# Patient Record
Sex: MALE | Race: OTHER | HISPANIC OR LATINO | URBAN - METROPOLITAN AREA
[De-identification: names, ages, dates, MRNs, and addresses within clinical notes are randomized per-mention and may not be internally consistent; named-entity substitution may affect disease eponyms.]

---

## 2017-10-01 ENCOUNTER — EMERGENCY (EMERGENCY)
Facility: HOSPITAL | Age: 22
LOS: 1 days | Discharge: HOME | End: 2017-10-01
Attending: EMERGENCY MEDICINE
Payer: MEDICAID

## 2017-10-01 VITALS
OXYGEN SATURATION: 100 % | TEMPERATURE: 98 F | RESPIRATION RATE: 18 BRPM | SYSTOLIC BLOOD PRESSURE: 115 MMHG | HEART RATE: 70 BPM | DIASTOLIC BLOOD PRESSURE: 60 MMHG

## 2017-10-01 VITALS
RESPIRATION RATE: 16 BRPM | SYSTOLIC BLOOD PRESSURE: 128 MMHG | TEMPERATURE: 98 F | HEART RATE: 81 BPM | DIASTOLIC BLOOD PRESSURE: 72 MMHG | OXYGEN SATURATION: 99 % | HEIGHT: 73 IN | WEIGHT: 149.91 LBS

## 2017-10-01 DIAGNOSIS — R07.9 CHEST PAIN, UNSPECIFIED: ICD-10-CM

## 2017-10-01 LAB
ANION GAP SERPL CALC-SCNC: 5 MMOL/L — SIGNIFICANT CHANGE UP (ref 5–17)
BASOPHILS # BLD AUTO: 0.1 K/UL — SIGNIFICANT CHANGE UP (ref 0–0.2)
BASOPHILS NFR BLD AUTO: 0.3 % — SIGNIFICANT CHANGE UP (ref 0–2)
BUN SERPL-MCNC: 18 MG/DL — SIGNIFICANT CHANGE UP (ref 7–18)
CALCIUM SERPL-MCNC: 8.9 MG/DL — SIGNIFICANT CHANGE UP (ref 8.4–10.5)
CHLORIDE SERPL-SCNC: 101 MMOL/L — SIGNIFICANT CHANGE UP (ref 96–108)
CK MB CFR SERPL CALC: <1 NG/ML — SIGNIFICANT CHANGE UP (ref 0–3.6)
CO2 SERPL-SCNC: 29 MMOL/L — SIGNIFICANT CHANGE UP (ref 22–31)
CREAT SERPL-MCNC: 0.97 MG/DL — SIGNIFICANT CHANGE UP (ref 0.5–1.3)
EOSINOPHIL # BLD AUTO: 0.1 K/UL — SIGNIFICANT CHANGE UP (ref 0–0.5)
EOSINOPHIL NFR BLD AUTO: 0.4 % — SIGNIFICANT CHANGE UP (ref 0–6)
GLUCOSE SERPL-MCNC: 92 MG/DL — SIGNIFICANT CHANGE UP (ref 70–99)
HCT VFR BLD CALC: 46.8 % — SIGNIFICANT CHANGE UP (ref 39–50)
HGB BLD-MCNC: 14.6 G/DL — SIGNIFICANT CHANGE UP (ref 13–17)
LYMPHOCYTES # BLD AUTO: 1.3 K/UL — SIGNIFICANT CHANGE UP (ref 1–3.3)
LYMPHOCYTES # BLD AUTO: 7.7 % — LOW (ref 13–44)
MCHC RBC-ENTMCNC: 30.1 PG — SIGNIFICANT CHANGE UP (ref 27–34)
MCHC RBC-ENTMCNC: 31.3 GM/DL — LOW (ref 32–36)
MCV RBC AUTO: 96.2 FL — SIGNIFICANT CHANGE UP (ref 80–100)
MONOCYTES # BLD AUTO: 1.3 K/UL — HIGH (ref 0–0.9)
MONOCYTES NFR BLD AUTO: 7.3 % — SIGNIFICANT CHANGE UP (ref 2–14)
NEUTROPHILS # BLD AUTO: 14.5 K/UL — HIGH (ref 1.8–7.4)
NEUTROPHILS NFR BLD AUTO: 84.3 % — HIGH (ref 43–77)
PLATELET # BLD AUTO: 299 K/UL — SIGNIFICANT CHANGE UP (ref 150–400)
POTASSIUM SERPL-MCNC: 4.4 MMOL/L — SIGNIFICANT CHANGE UP (ref 3.5–5.3)
POTASSIUM SERPL-SCNC: 4.4 MMOL/L — SIGNIFICANT CHANGE UP (ref 3.5–5.3)
RBC # BLD: 4.86 M/UL — SIGNIFICANT CHANGE UP (ref 4.2–5.8)
RBC # FLD: 12.4 % — SIGNIFICANT CHANGE UP (ref 10.3–14.5)
SODIUM SERPL-SCNC: 135 MMOL/L — SIGNIFICANT CHANGE UP (ref 135–145)
TROPONIN I SERPL-MCNC: <0.015 NG/ML — SIGNIFICANT CHANGE UP (ref 0–0.04)
WBC # BLD: 17.2 K/UL — HIGH (ref 3.8–10.5)
WBC # FLD AUTO: 17.2 K/UL — HIGH (ref 3.8–10.5)

## 2017-10-01 PROCEDURE — 80048 BASIC METABOLIC PNL TOTAL CA: CPT

## 2017-10-01 PROCEDURE — 36000 PLACE NEEDLE IN VEIN: CPT

## 2017-10-01 PROCEDURE — 85027 COMPLETE CBC AUTOMATED: CPT

## 2017-10-01 PROCEDURE — 93005 ELECTROCARDIOGRAM TRACING: CPT

## 2017-10-01 PROCEDURE — 71046 X-RAY EXAM CHEST 2 VIEWS: CPT

## 2017-10-01 PROCEDURE — 71020: CPT | Mod: 26

## 2017-10-01 PROCEDURE — 99285 EMERGENCY DEPT VISIT HI MDM: CPT | Mod: 25

## 2017-10-01 PROCEDURE — 99283 EMERGENCY DEPT VISIT LOW MDM: CPT | Mod: 25

## 2017-10-01 PROCEDURE — 84484 ASSAY OF TROPONIN QUANT: CPT

## 2017-10-01 PROCEDURE — 82553 CREATINE MB FRACTION: CPT

## 2017-10-01 NOTE — ED PROVIDER NOTE - MEDICAL DECISION MAKING DETAILS
23 y/o male presents with chest pain.  PERC negative, EKG is NSR.  Will check x-ray, labs, and reassess

## 2017-10-01 NOTE — ED PROVIDER NOTE - OBJECTIVE STATEMENT
21 y/o male with PMHx of depression/ anxiety presents with complaint of chest pain.  pt says he was sitting in bed when the pain began.  Denies nasuea, vomiting, or shortness of breath.  Symptoms resolved by the time the patient arrived to the ED. pt says he has had similar symptoms in the past. 23 y/o male with PMHx of depression/ anxiety presents with complaint of chest pain.  pt says he was sitting in bed when the pain began.  Denies nausea, vomiting, or shortness of breath.  Symptoms resolved by the time the patient arrived to the ED. pt says he has had similar symptoms in the past.

## 2018-01-17 ENCOUNTER — INPATIENT (INPATIENT)
Facility: HOSPITAL | Age: 23
LOS: 5 days | Discharge: HOME | DRG: 419 | End: 2018-01-23
Attending: HOSPITALIST | Admitting: HOSPITALIST
Payer: MEDICAID

## 2018-01-17 VITALS
SYSTOLIC BLOOD PRESSURE: 118 MMHG | TEMPERATURE: 98 F | OXYGEN SATURATION: 99 % | WEIGHT: 160.06 LBS | HEIGHT: 73 IN | DIASTOLIC BLOOD PRESSURE: 75 MMHG | HEART RATE: 66 BPM | RESPIRATION RATE: 16 BRPM

## 2018-01-17 LAB
ALBUMIN SERPL ELPH-MCNC: 3.8 G/DL — SIGNIFICANT CHANGE UP (ref 3.5–5)
ALP SERPL-CCNC: 74 U/L — SIGNIFICANT CHANGE UP (ref 40–120)
ALT FLD-CCNC: 155 U/L DA — HIGH (ref 10–60)
ANION GAP SERPL CALC-SCNC: 7 MMOL/L — SIGNIFICANT CHANGE UP (ref 5–17)
AST SERPL-CCNC: 291 U/L — HIGH (ref 10–40)
BASOPHILS # BLD AUTO: 0.1 K/UL — SIGNIFICANT CHANGE UP (ref 0–0.2)
BASOPHILS NFR BLD AUTO: 0.5 % — SIGNIFICANT CHANGE UP (ref 0–2)
BILIRUB SERPL-MCNC: 0.3 MG/DL — SIGNIFICANT CHANGE UP (ref 0.2–1.2)
BUN SERPL-MCNC: 14 MG/DL — SIGNIFICANT CHANGE UP (ref 7–18)
CALCIUM SERPL-MCNC: 8.7 MG/DL — SIGNIFICANT CHANGE UP (ref 8.4–10.5)
CHLORIDE SERPL-SCNC: 105 MMOL/L — SIGNIFICANT CHANGE UP (ref 96–108)
CO2 SERPL-SCNC: 28 MMOL/L — SIGNIFICANT CHANGE UP (ref 22–31)
CREAT SERPL-MCNC: 0.86 MG/DL — SIGNIFICANT CHANGE UP (ref 0.5–1.3)
EOSINOPHIL # BLD AUTO: 0.1 K/UL — SIGNIFICANT CHANGE UP (ref 0–0.5)
EOSINOPHIL NFR BLD AUTO: 0.9 % — SIGNIFICANT CHANGE UP (ref 0–6)
GLUCOSE SERPL-MCNC: 114 MG/DL — HIGH (ref 70–99)
HCT VFR BLD CALC: 43.1 % — SIGNIFICANT CHANGE UP (ref 39–50)
HGB BLD-MCNC: 14.1 G/DL — SIGNIFICANT CHANGE UP (ref 13–17)
LIDOCAIN IGE QN: HIGH U/L (ref 73–393)
LYMPHOCYTES # BLD AUTO: 1.8 K/UL — SIGNIFICANT CHANGE UP (ref 1–3.3)
LYMPHOCYTES # BLD AUTO: 13.6 % — SIGNIFICANT CHANGE UP (ref 13–44)
MCHC RBC-ENTMCNC: 31.3 PG — SIGNIFICANT CHANGE UP (ref 27–34)
MCHC RBC-ENTMCNC: 32.8 GM/DL — SIGNIFICANT CHANGE UP (ref 32–36)
MCV RBC AUTO: 95.4 FL — SIGNIFICANT CHANGE UP (ref 80–100)
MONOCYTES # BLD AUTO: 1 K/UL — HIGH (ref 0–0.9)
MONOCYTES NFR BLD AUTO: 7.5 % — SIGNIFICANT CHANGE UP (ref 2–14)
NEUTROPHILS # BLD AUTO: 10.5 K/UL — HIGH (ref 1.8–7.4)
NEUTROPHILS NFR BLD AUTO: 77.6 % — HIGH (ref 43–77)
PLATELET # BLD AUTO: 236 K/UL — SIGNIFICANT CHANGE UP (ref 150–400)
POTASSIUM SERPL-MCNC: 3.9 MMOL/L — SIGNIFICANT CHANGE UP (ref 3.5–5.3)
POTASSIUM SERPL-SCNC: 3.9 MMOL/L — SIGNIFICANT CHANGE UP (ref 3.5–5.3)
PROT SERPL-MCNC: 7.2 G/DL — SIGNIFICANT CHANGE UP (ref 6–8.3)
RBC # BLD: 4.52 M/UL — SIGNIFICANT CHANGE UP (ref 4.2–5.8)
RBC # FLD: 11.6 % — SIGNIFICANT CHANGE UP (ref 10.3–14.5)
SODIUM SERPL-SCNC: 140 MMOL/L — SIGNIFICANT CHANGE UP (ref 135–145)
WBC # BLD: 13.6 K/UL — HIGH (ref 3.8–10.5)
WBC # FLD AUTO: 13.6 K/UL — HIGH (ref 3.8–10.5)

## 2018-01-17 PROCEDURE — 74177 CT ABD & PELVIS W/CONTRAST: CPT | Mod: 26

## 2018-01-17 RX ORDER — FAMOTIDINE 10 MG/ML
20 INJECTION INTRAVENOUS ONCE
Qty: 0 | Refills: 0 | Status: COMPLETED | OUTPATIENT
Start: 2018-01-17 | End: 2018-01-17

## 2018-01-17 RX ORDER — SODIUM CHLORIDE 9 MG/ML
1000 INJECTION INTRAMUSCULAR; INTRAVENOUS; SUBCUTANEOUS ONCE
Qty: 0 | Refills: 0 | Status: COMPLETED | OUTPATIENT
Start: 2018-01-17 | End: 2018-01-17

## 2018-01-17 RX ORDER — ONDANSETRON 8 MG/1
4 TABLET, FILM COATED ORAL ONCE
Qty: 0 | Refills: 0 | Status: COMPLETED | OUTPATIENT
Start: 2018-01-17 | End: 2018-01-17

## 2018-01-17 RX ADMIN — SODIUM CHLORIDE 1000 MILLILITER(S): 9 INJECTION INTRAMUSCULAR; INTRAVENOUS; SUBCUTANEOUS at 22:09

## 2018-01-17 RX ADMIN — FAMOTIDINE 20 MILLIGRAM(S): 10 INJECTION INTRAVENOUS at 22:09

## 2018-01-17 NOTE — ED PROVIDER NOTE - PROGRESS NOTE DETAILS
Pt reassessed, still with abdominal discomfort.  CT with no signs Pt reassessed, still with abdominal discomfort.  CT with no signs of pancreatitis.  Given abdominal pain and patient not tolerating PO, will admit.

## 2018-01-17 NOTE — ED PROVIDER NOTE - OBJECTIVE STATEMENT
21 y/o M pt w/ PMHx of Anxiety and Depression presents to ED c/o epigastric burning pain, worse since yesterday. Pt also reports nausea. Pt denies vomiting, fever, chills, or any other complaints. NKDA.

## 2018-01-18 DIAGNOSIS — R74.8 ABNORMAL LEVELS OF OTHER SERUM ENZYMES: ICD-10-CM

## 2018-01-18 DIAGNOSIS — F32.9 MAJOR DEPRESSIVE DISORDER, SINGLE EPISODE, UNSPECIFIED: ICD-10-CM

## 2018-01-18 DIAGNOSIS — Z29.9 ENCOUNTER FOR PROPHYLACTIC MEASURES, UNSPECIFIED: ICD-10-CM

## 2018-01-18 DIAGNOSIS — K85.90 ACUTE PANCREATITIS WITHOUT NECROSIS OR INFECTION, UNSPECIFIED: ICD-10-CM

## 2018-01-18 DIAGNOSIS — K21.9 GASTRO-ESOPHAGEAL REFLUX DISEASE WITHOUT ESOPHAGITIS: ICD-10-CM

## 2018-01-18 DIAGNOSIS — F41.9 ANXIETY DISORDER, UNSPECIFIED: ICD-10-CM

## 2018-01-18 LAB
24R-OH-CALCIDIOL SERPL-MCNC: 13.5 NG/ML — LOW (ref 30–80)
ALBUMIN SERPL ELPH-MCNC: 3.5 G/DL — SIGNIFICANT CHANGE UP (ref 3.5–5)
ALP SERPL-CCNC: 76 U/L — SIGNIFICANT CHANGE UP (ref 40–120)
ALT FLD-CCNC: 445 U/L DA — HIGH (ref 10–60)
AMYLASE P1 CFR SERPL: 2064 U/L — HIGH (ref 25–115)
AMYLASE P1 CFR SERPL: 617 U/L — HIGH (ref 25–115)
ANION GAP SERPL CALC-SCNC: 6 MMOL/L — SIGNIFICANT CHANGE UP (ref 5–17)
APPEARANCE UR: CLEAR — SIGNIFICANT CHANGE UP
AST SERPL-CCNC: 500 U/L — HIGH (ref 10–40)
BILIRUB DIRECT SERPL-MCNC: 0.1 MG/DL — SIGNIFICANT CHANGE UP (ref 0–0.2)
BILIRUB INDIRECT FLD-MCNC: 0.2 MG/DL — SIGNIFICANT CHANGE UP (ref 0.2–1)
BILIRUB SERPL-MCNC: 0.3 MG/DL — SIGNIFICANT CHANGE UP (ref 0.2–1.2)
BILIRUB UR-MCNC: NEGATIVE — SIGNIFICANT CHANGE UP
BUN SERPL-MCNC: 10 MG/DL — SIGNIFICANT CHANGE UP (ref 7–18)
CALCIUM SERPL-MCNC: 8.6 MG/DL — SIGNIFICANT CHANGE UP (ref 8.4–10.5)
CHLORIDE SERPL-SCNC: 105 MMOL/L — SIGNIFICANT CHANGE UP (ref 96–108)
CHOLEST SERPL-MCNC: 117 MG/DL — SIGNIFICANT CHANGE UP (ref 10–199)
CO2 SERPL-SCNC: 29 MMOL/L — SIGNIFICANT CHANGE UP (ref 22–31)
COLOR SPEC: YELLOW — SIGNIFICANT CHANGE UP
CREAT SERPL-MCNC: 0.83 MG/DL — SIGNIFICANT CHANGE UP (ref 0.5–1.3)
DIFF PNL FLD: NEGATIVE — SIGNIFICANT CHANGE UP
ETHANOL SERPL-MCNC: <3 MG/DL — SIGNIFICANT CHANGE UP (ref 0–10)
GLUCOSE SERPL-MCNC: 86 MG/DL — SIGNIFICANT CHANGE UP (ref 70–99)
GLUCOSE UR QL: NEGATIVE — SIGNIFICANT CHANGE UP
HAV IGM SER-ACNC: SIGNIFICANT CHANGE UP
HBV CORE IGM SER-ACNC: SIGNIFICANT CHANGE UP
HBV SURFACE AG SER-ACNC: SIGNIFICANT CHANGE UP
HCT VFR BLD CALC: 41.5 % — SIGNIFICANT CHANGE UP (ref 39–50)
HCV AB S/CO SERPL IA: 0.07 S/CO — SIGNIFICANT CHANGE UP
HCV AB SERPL-IMP: SIGNIFICANT CHANGE UP
HDLC SERPL-MCNC: 44 MG/DL — SIGNIFICANT CHANGE UP (ref 40–125)
HGB BLD-MCNC: 13.6 G/DL — SIGNIFICANT CHANGE UP (ref 13–17)
HIV 1+2 AB+HIV1 P24 AG SERPL QL IA: SIGNIFICANT CHANGE UP
KETONES UR-MCNC: NEGATIVE — SIGNIFICANT CHANGE UP
LEUKOCYTE ESTERASE UR-ACNC: NEGATIVE — SIGNIFICANT CHANGE UP
LIDOCAIN IGE QN: 6747 U/L — HIGH (ref 73–393)
LIPID PNL WITH DIRECT LDL SERPL: 62 MG/DL — SIGNIFICANT CHANGE UP
MCHC RBC-ENTMCNC: 31.6 PG — SIGNIFICANT CHANGE UP (ref 27–34)
MCHC RBC-ENTMCNC: 32.7 GM/DL — SIGNIFICANT CHANGE UP (ref 32–36)
MCV RBC AUTO: 96.8 FL — SIGNIFICANT CHANGE UP (ref 80–100)
NITRITE UR-MCNC: NEGATIVE — SIGNIFICANT CHANGE UP
PCP SPEC-MCNC: SIGNIFICANT CHANGE UP
PH UR: 6 — SIGNIFICANT CHANGE UP (ref 5–8)
PLATELET # BLD AUTO: 221 K/UL — SIGNIFICANT CHANGE UP (ref 150–400)
POTASSIUM SERPL-MCNC: 4.1 MMOL/L — SIGNIFICANT CHANGE UP (ref 3.5–5.3)
POTASSIUM SERPL-SCNC: 4.1 MMOL/L — SIGNIFICANT CHANGE UP (ref 3.5–5.3)
PROT SERPL-MCNC: 6.7 G/DL — SIGNIFICANT CHANGE UP (ref 6–8.3)
PROT UR-MCNC: NEGATIVE — SIGNIFICANT CHANGE UP
RBC # BLD: 4.29 M/UL — SIGNIFICANT CHANGE UP (ref 4.2–5.8)
RBC # FLD: 11.6 % — SIGNIFICANT CHANGE UP (ref 10.3–14.5)
SODIUM SERPL-SCNC: 140 MMOL/L — SIGNIFICANT CHANGE UP (ref 135–145)
SP GR SPEC: 1.01 — SIGNIFICANT CHANGE UP (ref 1.01–1.02)
TOTAL CHOLESTEROL/HDL RATIO MEASUREMENT: 2.7 RATIO — LOW (ref 3.4–9.6)
TRIGL SERPL-MCNC: 56 MG/DL — SIGNIFICANT CHANGE UP (ref 10–149)
TSH SERPL-MCNC: 0.89 UU/ML — SIGNIFICANT CHANGE UP (ref 0.34–4.82)
UROBILINOGEN FLD QL: NEGATIVE — SIGNIFICANT CHANGE UP
WBC # BLD: 8.4 K/UL — SIGNIFICANT CHANGE UP (ref 3.8–10.5)
WBC # FLD AUTO: 8.4 K/UL — SIGNIFICANT CHANGE UP (ref 3.8–10.5)

## 2018-01-18 PROCEDURE — 99223 1ST HOSP IP/OBS HIGH 75: CPT | Mod: GC

## 2018-01-18 PROCEDURE — 99285 EMERGENCY DEPT VISIT HI MDM: CPT

## 2018-01-18 PROCEDURE — 71045 X-RAY EXAM CHEST 1 VIEW: CPT | Mod: 26

## 2018-01-18 PROCEDURE — 76705 ECHO EXAM OF ABDOMEN: CPT | Mod: 26

## 2018-01-18 RX ORDER — PANTOPRAZOLE SODIUM 20 MG/1
40 TABLET, DELAYED RELEASE ORAL DAILY
Qty: 0 | Refills: 0 | Status: DISCONTINUED | OUTPATIENT
Start: 2018-01-18 | End: 2018-01-23

## 2018-01-18 RX ORDER — MORPHINE SULFATE 50 MG/1
1 CAPSULE, EXTENDED RELEASE ORAL EVERY 4 HOURS
Qty: 0 | Refills: 0 | Status: DISCONTINUED | OUTPATIENT
Start: 2018-01-18 | End: 2018-01-22

## 2018-01-18 RX ORDER — MORPHINE SULFATE 50 MG/1
4 CAPSULE, EXTENDED RELEASE ORAL ONCE
Qty: 0 | Refills: 0 | Status: DISCONTINUED | OUTPATIENT
Start: 2018-01-18 | End: 2018-01-18

## 2018-01-18 RX ORDER — SODIUM CHLORIDE 9 MG/ML
1000 INJECTION, SOLUTION INTRAVENOUS
Qty: 0 | Refills: 0 | Status: DISCONTINUED | OUTPATIENT
Start: 2018-01-18 | End: 2018-01-19

## 2018-01-18 RX ORDER — MORPHINE SULFATE 50 MG/1
2 CAPSULE, EXTENDED RELEASE ORAL EVERY 6 HOURS
Qty: 0 | Refills: 0 | Status: DISCONTINUED | OUTPATIENT
Start: 2018-01-18 | End: 2018-01-23

## 2018-01-18 RX ORDER — RANITIDINE HYDROCHLORIDE 150 MG/1
1 TABLET, FILM COATED ORAL
Qty: 0 | Refills: 0 | COMMUNITY

## 2018-01-18 RX ADMIN — MORPHINE SULFATE 1 MILLIGRAM(S): 50 CAPSULE, EXTENDED RELEASE ORAL at 22:30

## 2018-01-18 RX ADMIN — MORPHINE SULFATE 1 MILLIGRAM(S): 50 CAPSULE, EXTENDED RELEASE ORAL at 22:11

## 2018-01-18 RX ADMIN — SODIUM CHLORIDE 200 MILLILITER(S): 9 INJECTION, SOLUTION INTRAVENOUS at 22:11

## 2018-01-18 RX ADMIN — PANTOPRAZOLE SODIUM 40 MILLIGRAM(S): 20 TABLET, DELAYED RELEASE ORAL at 12:25

## 2018-01-18 RX ADMIN — MORPHINE SULFATE 4 MILLIGRAM(S): 50 CAPSULE, EXTENDED RELEASE ORAL at 01:14

## 2018-01-18 RX ADMIN — SODIUM CHLORIDE 200 MILLILITER(S): 9 INJECTION, SOLUTION INTRAVENOUS at 01:55

## 2018-01-18 RX ADMIN — MORPHINE SULFATE 4 MILLIGRAM(S): 50 CAPSULE, EXTENDED RELEASE ORAL at 01:17

## 2018-01-18 NOTE — PATIENT PROFILE ADULT. - MENTAL HEALTH CONDITIONS/SYMPTOMS, PROFILE
depression/not presently taking medications. Did not want to be on medications long-term/anxiety disorder

## 2018-01-18 NOTE — H&P ADULT - NSHPLABSRESULTS_GEN_ALL_CORE
ICU Vital Signs Last 24 Hrs  T(C): 36.6 (18 Jan 2018 01:11), Max: 36.7 (17 Jan 2018 19:44)  T(F): 97.9 (18 Jan 2018 01:11), Max: 98 (17 Jan 2018 19:44)  HR: 64 (18 Jan 2018 01:11) (64 - 66)  BP: 112/64 (18 Jan 2018 01:11) (112/64 - 118/75)  BP(mean): --  ABP: --  ABP(mean): --  RR: 18 (18 Jan 2018 01:11) (16 - 18)  SpO2: 100% (18 Jan 2018 01:11) (99% - 100%)                          14.1   13.6  )-----------( 236      ( 17 Jan 2018 22:18 )             43.1       01-17    140  |  105  |  14  ----------------------------<  114<H>  3.9   |  28  |  0.86    Ca    8.7      17 Jan 2018 22:18    TPro  7.2  /  Alb  3.8  /  TBili  0.3  /  DBili  x   /  AST  291<H>  /  ALT  155<H>  /  AlkPhos  74  01-17  < from: CT Abdomen and Pelvis w/ IV Cont (01.17.18 @ 23:39) >    FINDINGS:  Lung bases: Clear.    Organs: The liver, gallbladder, spleen, pancreas, kidneys and adrenal   glands are unremarkable.    Gastrointestinal tract: There is no evidence of a bowel obstruction or   inflammation. The appendix is not definitely demonstrated; however, there   are no secondary signs of appendicitis.    Vascular: There is no abdominal aortic aneurysm.    Pelvis: The urinary bladder, prostate and seminal vesicles are   unremarkable.     Miscellaneous: There is no significant abdominal or pelvic   lymphadenopathy. No free air or free fluid is demonstrated.    Bones: The visualized osseous structures are unremarkable.    IMPRESSION:  No CT evidence of pancreatitis. Correlate with amylase and lipase.     < end of copied text >

## 2018-01-18 NOTE — H&P ADULT - NSHPSOCIALHISTORY_GEN_ALL_CORE
ex smoker, quit 1 year ago, smoked 1 cigar/ 1.5 years, drinks alcohol socially ( vodka/ rum), last drink was in sept 2017, used marijuana in the past, never been sexually active.

## 2018-01-18 NOTE — H&P ADULT - ATTENDING COMMENTS
Patient was seen and examined by myself this morning at about 11 am. Case was discussed with house staff in details. I have reviewed and agree with the plan as outlined above with edits where appropriate.  Please see attending chart note addendum.

## 2018-01-18 NOTE — H&P ADULT - HISTORY OF PRESENT ILLNESS
21 y/o M PMHx of Anxiety and Depression ( was on Lexapro and alprazolam Hx of suicide attempts X2, GERD presented to ED with c/o abdominal pain since 3 days. Patient started to have epigastric abdominal pain starting july 2017 saw PCP in Dec who prescribed ranitidine for gastritis, and symptoms were under control, until 3 days back when abdominal pain got worse- describes as epigastric, intermittent, lasting for 2 hours, burning and constricting type, radiating to back, 6 to 9/10 intensity, used Tylenol once without relief and has been using ranitidine without any resolution of pain, aggravated with intake of spicy foods, no relieving factors, associated with nausea. Patient denies vomiting, fever, chills, diarrhea, constipation or any other complaints.  PSH- none  FH- not significant  allergies- NKDA  Social Hx- exsmoker, quit 1 year ago, smoked 1 cigar/ 1.5 years, drinks alcohol socially ( vodka/ rum), last drink was in sept 2017, used marijuana in the past, never been sexually active.    In ED, patient had stable vitals. Labs pertinent for WBC- 13.6, AST/ ALT of 291/155, [place] of [address], s/p 1L NS bolus, 1 dose each of Pepcid, Zofran,  morphine in ED.    will admit to medicine for management of pancreatitis 21 y/o M PMHx of Anxiety and Depression ( was on Lexapro and alprazolam Hx of suicide attempts X2, GERD presented to ED with c/o abdominal pain since 3 days. Patient started to have epigastric abdominal pain starting july 2017 saw PCP in Dec who prescribed ranitidine for gastritis, and symptoms were under control, until 3 days back when abdominal pain got worse- describes as epigastric, intermittent, lasting for 2 hours, burning and constricting type, radiating to back, 6 to 9/10 intensity, used Tylenol once without relief and has been using ranitidine without any resolution of pain, aggravated with intake of spicy foods, no relieving factors, associated with nausea. Patient denies vomiting, fever, chills, diarrhea, constipation or any other complaints.  PSH- none  FH- not significant  allergies- NKDA  Social Hx- exsmoker, quit 1 year ago, smoked 1 cigar/ 1.5 years, drinks alcohol socially ( vodka/ rum), last drink was in sept 2017, used marijuana in the past, never been sexually active.    In ED, patient had stable vitals. CT abdomen s/o normal study, no evidence of pancreatitis.  Labs pertinent for WBC- 13.6, AST/ ALT of 291/155, [place] of [address], s/p 1L NS bolus, 1 dose each of Pepcid, Zofran,  morphine in ED.    will admit to medicine for evaluation of elevated lipase. 21 y/o M PMHx of Anxiety and Depression ( was on Lexapro and alprazolam Hx of suicide attempts X2, GERD presented to ED with c/o abdominal pain since 3 days. Patient started to have epigastric abdominal pain starting july 2017 saw PCP in Dec who prescribed ranitidine for gastritis, and symptoms were under control, until 3 days back when abdominal pain got worse- describes as epigastric, intermittent, lasting for 2 hours, burning and constricting type, radiating to back, 6 to 9/10 intensity, used Tylenol once without relief and has been using ranitidine without any resolution of pain, aggravated with intake of spicy foods, no relieving factors, associated with nausea. Patient denies vomiting, fever, chills, diarrhea, constipation or any other complaints.  PSH- none  FH- not significant  allergies- NKDA  Social Hx- exsmoker, quit 1 year ago, smoked 1 cigar/ 1.5 years, drinks alcohol socially ( vodka/ rum), last drink was in sept 2017, used marijuana in the past, never been sexually active.    In ED, patient had stable vitals. CT abdomen s/o normal study, no evidence of pancreatitis.  Labs pertinent for WBC- 13.6, AST/ ALT of 291/155, [place] 6[address], s/p 1L NS bolus, 1 dose each of Pepcid, Zofran,  morphine in ED.    will admit to medicine for evaluation of elevated lipase secondary to pancreatitis 23 y/o M PMHx of Anxiety and Depression ( was on Lexapro and alprazolam Hx of suicide attempts X2, GERD presented to ED with c/o abdominal pain since 3 days. Patient started to have epigastric abdominal pain starting july 2017 saw PCP in Dec who prescribed ranitidine for gastritis, and symptoms were under control, until 3 days back when abdominal pain got worse- describes as epigastric, intermittent, lasting for 2 hours, burning and constricting type, radiating to back, 6 to 9/10 intensity, used Tylenol once without relief and has been using ranitidine without any resolution of pain, aggravated with intake of spicy foods, no relieving factors, associated with nausea. Patient denies vomiting, fever, chills, diarrhea, constipation or any other complaints.  PSH- none  FH- not significant  allergies- NKDA  Social Hx- exsmoker, quit 1 year ago, smoked 1 cigar/ 1.5 years, drinks alcohol socially ( vodka/ rum), last drink was in sept 2017, used marijuana in the past, never been sexually active.    In ED, patient had stable vitals. CT abdomen s/o normal study, no evidence of pancreatitis.  Labs pertinent for WBC- 13.6, AST/ ALT of 291/155, lip[place] of 857[address], s/p 1L NS bolus, 1 dose each of Pepcid, Zofran,  morphine in ED.    will admit to medicine for management of  pancreatitis

## 2018-01-18 NOTE — H&P ADULT - NSHPREVIEWOFSYSTEMS_GEN_ALL_CORE
ROS negative for fever, SOB, chest pain, cough, rash, headache, dizziness, diaphoresis, palpitations, weakness, numbness, tingling of extremities, diarrhea, constipation, urinary disturbances.

## 2018-01-18 NOTE — PATIENT PROFILE ADULT. - PRO MENTAL HEALTH SX RECENT
feeling depressed/feels anxious times. Does not wish to talk to anyone at this time. No suicidal or homicidal ideations

## 2018-01-18 NOTE — CHART NOTE - NSCHARTNOTEFT_GEN_A_CORE
ATTENDING ADDENDUM TO ADMIT HISTORY AND PHYSICAL- late entry  Patient seen and examined by me this morning and case was discussed with the admitting resident    HPI:  21 y/o M PMHx of Anxiety and Depression ( was on Lexapro and alprazolam Hx of suicide attempts X2, GERD presented to ED with c/o abdominal pain since 3 days. Patient started to have epigastric abdominal pain starting 2017 saw PCP in Dec who prescribed ranitidine for gastritis, and symptoms were under control, until 3 days back when abdominal pain got worse- describes as epigastric, intermittent, lasting for 2 hours, burning and constricting type, radiating to back, 6 to 9/10 intensity, used Tylenol once without relief and has been using ranitidine without any resolution of pain, aggravated with intake of spicy foods, no relieving factors, associated with nausea. Patient denies vomiting, fever, chills, diarrhea, constipation or any other complaints.    PSH- none  FH- not significant  allergies- NKDA  Social Hx- exsmoker, quit 1 year ago, smoked 1 cigar/ 1.5 years, drinks alcohol socially ( vodka/ rum), last drink was in 2017, used marijuana in the past, never been sexually active.    In ED, patient had stable vitals. CT abdomen s/o normal study, no evidence of pancreatitis.  Labs pertinent for WBC- 13.6, AST/ ALT of 291/155, [place] of [address], s/p 1L NS bolus, 1 dose each of Pepcid, Zofran,  morphine in ED.    will admit to medicine for management of  pancreatitis (2018 01:32)    ROS: as in HPI; All other systems reviewed are negative    SH: smoker (-)  Alcohol - occasional  No pertinent family history in first degree relatives      Vital Signs Last 24 Hrs  T(C): 37 (2018 21:08), Max: 37 (2018 11:54)  T(F): 98.6 (2018 21:08), Max: 98.6 (2018 11:54)  HR: 64 (2018 21:08) (55 - 65)  BP: 127/54 (2018 21:08) (109/54 - 127/54)  BP(mean): --  RR: 16 (2018 21:08) (16 - 18)  SpO2: 100% (2018 21:08) (99% - 100%)    O/E-   AAOx 3; NAD: CVS- s1s2+; EXTRE- non edema and no calf tenderness  ABD- soft nondistended; BS +; tenderness to palpation in epigastric area  NEURO- non focal; PSYCH- normal affect and behavior      Labs Reviewed:                         13.6   8.4   )-----------( 221      ( 2018 05:58 )             41.5         140  |  105  |  10  ----------------------------<  86  4.1   |  29  |  0.83    Ca    8.6      2018 05:58    TPro  6.7  /  Alb  3.5  /  TBili  0.3  /  DBili  0.1  /  AST  500<H>  /  ALT  445<H>  /  AlkPhos  76          Urinalysis Basic - ( 2018 04:47 )    Color: Yellow / Appearance: Clear / S.010 / pH: x  Gluc: x / Ketone: Negative  / Bili: Negative / Urobili: Negative   Blood: x / Protein: Negative / Nitrite: Negative   Leuk Esterase: Negative / RBC: x / WBC x   Sq Epi: x / Non Sq Epi: x / Bacteria: x      CXR  Reviewed: no acute pathology    A/P:  Admitted for acute pancreatitis- lipase trending down  ; continue with fluids  GI consult; pain control.  Other plan as outlined in H&P  Plan discussed with patient  in details at bedside and all his questions / concerns were addressed

## 2018-01-18 NOTE — H&P ADULT - NEUROLOGICAL DETAILS
superficial reflexes intact/alert and oriented x 3/sensation intact/cranial nerves intact/deep reflexes intact/normal strength

## 2018-01-18 NOTE — H&P ADULT - PROBLEM SELECTOR PLAN 5
IMPROVE VTE score -zero, no indication for chemical DVT ppx and please assess the need for chemical DVT prophylaxis if there is a change in clinical status  GI ppx with protonix as patient has GERD.

## 2018-01-18 NOTE — ED ADULT NURSE REASSESSMENT NOTE - NS ED NURSE REASSESS COMMENT FT1
Pt. is calm and cooperative with a flat affect. Pt. denies any pain at this time. No complaints voiced. No signs of distress noted. Will continue to monitor closely.
Endorsed to RN Sochet in stable condition, pain medication administered as per order. Patient is able to ambulate with steady gait. Family member by bedside.

## 2018-01-18 NOTE — H&P ADULT - ASSESSMENT
23 y/o M PMHx of Anxiety and Depression ( was on Lexapro and alprazolam Hx of suicide attempts X2, GERD presented to ED with c/o abdominal pain since 3 days.    In ED, patient had stable vitals. Labs pertinent for WBC- 13.6, AST/ ALT of 291/155, [place] of [address], s/p 1L NS bolus, 1 dose each of Pepcid, Zofran,  morphine in ED.    will admit to medicine for management of pancreatitis 21 y/o M PMHx of Anxiety and Depression ( was on Lexapro and alprazolam Hx of suicide attempts X2, GERD presented to ED with c/o abdominal pain since 3 days.    In ED, patient had stable vitals. CT abdomen s/o normal study, no evidence of pancreatitis. Labs pertinent for WBC- 13.6, AST/ ALT of 291/155, lipase of [address], s/p 1L NS bolus, 1 dose each of Pepcid, Zofran,  morphine in ED.    will admit to medicine for evaluation of elevated lipase. 21 y/o M PMHx of Anxiety and Depression ( was on Lexapro and alprazolam Hx of suicide attempts X2, GERD presented to ED with c/o abdominal pain since 3 days.    In ED, patient had stable vitals. CT abdomen s/o normal study, no evidence of pancreatitis. Labs pertinent for WBC- 13.6, AST/ ALT of 291/155, [place] of [address], s/p 1L NS bolus, 1 dose each of Pepcid, Zofran,  morphine in ED.    will admit to medicine for evaluation of elevated lipase secondary to pancreatitis. 21 y/o M PMHx of Anxiety and Depression ( was on Lexapro and alprazolam Hx of suicide attempts X2, GERD presented to ED with c/o abdominal pain since 3 days.    In ED, patient had stable vitals. CT abdomen s/o normal study, no evidence of pancreatitis. Labs pertinent for WBC- 13.6, AST/ ALT of 291/155, [place] of [address], s/p 1L NS bolus, 1 dose each of Pepcid, Zofran,  morphine in ED.    will admit to medicine for management of pancreatitis.

## 2018-01-18 NOTE — ED ADULT NURSE NOTE - CAS EDN DISCHARGE ASSESSMENT
Symptoms improved/No adverse reaction to first time med in ED/Alert and oriented to person, place and time/Awake

## 2018-01-18 NOTE — H&P ADULT - MUSCULOSKELETAL
detailed exam ROM intact/no joint swelling/no joint warmth/normal strength/no joint erythema/no calf tenderness

## 2018-01-18 NOTE — H&P ADULT - RS GEN PE MLT RESP DETAILS PC
no chest wall tenderness/clear to auscultation bilaterally/no rales/breath sounds equal/good air movement/respirations non-labored/airway patent/no rhonchi

## 2018-01-18 NOTE — H&P ADULT - PROBLEM SELECTOR PLAN 2
patient takes ranitidine for GERD, will start on protonix IV Once daily. patient takes ranitidine for GERD, will start on Protonix IV Once daily.

## 2018-01-18 NOTE — H&P ADULT - PROBLEM SELECTOR PLAN 1
Patient has characteristic epigastric pain radiating to back, with elevated lipase to 247[address].  CT abdomen s/o normal study, no evidence of pancreatitis.  meets 2 of 3 criteria but no imaging evidence of pancreatitis.  BISAP score- zero   s/p 1L NS bolus in ED.  will start on IV LR @200cc/hr.   keep NPO   pain management   lipid profile- WNL.  will get USG abdomen to r/o gall stones/ pancreatic calculus.   advance diet when pain improves, early enteral nutrition.  elevated lipase could be secondary to acute cholecystitis/ bowel obstruction/ duodenal ulcer/pancreatic calculus/ HIV/ drug induced.  start on IV protonix.  will get U tox, HIV, hepatitis panel, LFT in AM as patient has mild transaminitis. Patient has characteristic epigastric pain radiating to back, with elevated lipase to [address].  CT abdomen s/o normal study, no evidence of pancreatitis.  meets 2 of 3 criteria but no imaging evidence of pancreatitis.  BISAP score- zero   s/p 1L NS bolus in ED.  will start on IV LR @200cc/hr.   keep NPO   pain management   lipid profile- WNL.  will get USG abdomen to r/o gall stones/ pancreatic calculus.   advance diet when pain improves, early enteral nutrition.  elevated lipase could be secondary to acute cholecystitis/ bowel obstruction/ renal insufficiency/ duodenal ulcer/pancreatic calculus/ HIV/ drug induced.  start on IV protonix.  will get U tox, HIV, hepatitis panel, LFT in AM as patient has mild transaminitis. Patient has characteristic epigastric pain radiating to back, with elevated lipase to [address].  CT abdomen s/o normal study, no evidence of pancreatitis.  meets 2 of 3 criteria for pancreatitis.  BISAP score- zero   s/p 1L NS bolus in ED.  will start on IV LR @200cc/hr.   keep NPO   pain management   lipid profile- WNL.  will get USG abdomen to r/o gall stones/ pancreatic calculus.   advance diet when pain improves, early enteral nutrition.  elevated lipase could be secondary to acute cholecystitis/ bowel obstruction/ renal insufficiency/ duodenal ulcer/pancreatic calculus/ HIV/ drug induced.  start on IV Protonix.  will get U tox, HIV, hepatitis panel, LFT in AM as patient has mild transaminitis. Patient has characteristic epigastric pain radiating to back, with elevated lipase to 97738.  CT abdomen s/o normal study, no evidence of pancreatitis.  meets 2 of 3 criteria for pancreatitis.  BISAP score- zero   s/p 1L NS bolus in ED.  will start on IV LR @200cc/hr.   keep NPO   pain management   lipid profile- WNL.  will get USG abdomen to r/o gall stones/ pancreatic calculus.  f/u blood alcohol levels.   advance diet when pain improves, early enteral nutrition.  elevated lipase could be secondary to acute cholecystitis/ bowel obstruction/ renal insufficiency/ duodenal ulcer/pancreatic calculus/ HIV/ drug induced.  start on IV Protonix.  will get U tox, HIV, hepatitis panel, LFT in AM as patient has mild transaminitis. Patient has characteristic epigastric pain radiating to back, with elevated lipase to [address].  CT abdomen s/o normal study, no evidence of pancreatitis.  meets 2 of 3 criteria for pancreatitis.  BISAP score- zero   s/p 1L NS bolus in ED.  will start on IV LR @200cc/hr.   keep NPO   pain management   lipid profile- WNL.  will get USG abdomen to r/o gall stones/ pancreatic calculus.  will get IgG4 to r/o autoimmune pancreatitis  f/u blood alcohol levels.   advance diet when pain improves, early enteral nutrition.  elevated lipase could be secondary to acute cholecystitis/ bowel obstruction/ renal insufficiency/ duodenal ulcer/pancreatic calculus/ HIV/ drug induced.  start on IV Protonix.  will get U tox, HIV, hepatitis panel, LFT in AM as patient has mild transaminitis.

## 2018-01-19 LAB
ALBUMIN SERPL ELPH-MCNC: 3.4 G/DL — LOW (ref 3.5–5)
ALP SERPL-CCNC: 81 U/L — SIGNIFICANT CHANGE UP (ref 40–120)
ALT FLD-CCNC: 246 U/L DA — HIGH (ref 10–60)
ANION GAP SERPL CALC-SCNC: 6 MMOL/L — SIGNIFICANT CHANGE UP (ref 5–17)
AST SERPL-CCNC: 82 U/L — HIGH (ref 10–40)
BILIRUB SERPL-MCNC: 0.6 MG/DL — SIGNIFICANT CHANGE UP (ref 0.2–1.2)
BUN SERPL-MCNC: 9 MG/DL — SIGNIFICANT CHANGE UP (ref 7–18)
CALCIUM SERPL-MCNC: 9 MG/DL — SIGNIFICANT CHANGE UP (ref 8.4–10.5)
CHLORIDE SERPL-SCNC: 103 MMOL/L — SIGNIFICANT CHANGE UP (ref 96–108)
CO2 SERPL-SCNC: 30 MMOL/L — SIGNIFICANT CHANGE UP (ref 22–31)
CREAT SERPL-MCNC: 0.77 MG/DL — SIGNIFICANT CHANGE UP (ref 0.5–1.3)
GLUCOSE SERPL-MCNC: 81 MG/DL — SIGNIFICANT CHANGE UP (ref 70–99)
LIDOCAIN IGE QN: 213 U/L — SIGNIFICANT CHANGE UP (ref 73–393)
POTASSIUM SERPL-MCNC: 4 MMOL/L — SIGNIFICANT CHANGE UP (ref 3.5–5.3)
POTASSIUM SERPL-SCNC: 4 MMOL/L — SIGNIFICANT CHANGE UP (ref 3.5–5.3)
PROT SERPL-MCNC: 6.7 G/DL — SIGNIFICANT CHANGE UP (ref 6–8.3)
SODIUM SERPL-SCNC: 139 MMOL/L — SIGNIFICANT CHANGE UP (ref 135–145)

## 2018-01-19 PROCEDURE — 99233 SBSQ HOSP IP/OBS HIGH 50: CPT | Mod: GC

## 2018-01-19 PROCEDURE — 99221 1ST HOSP IP/OBS SF/LOW 40: CPT

## 2018-01-19 PROCEDURE — 99222 1ST HOSP IP/OBS MODERATE 55: CPT

## 2018-01-19 RX ORDER — SODIUM CHLORIDE 9 MG/ML
1000 INJECTION, SOLUTION INTRAVENOUS
Qty: 0 | Refills: 0 | Status: DISCONTINUED | OUTPATIENT
Start: 2018-01-19 | End: 2018-01-19

## 2018-01-19 RX ORDER — KETOROLAC TROMETHAMINE 30 MG/ML
30 SYRINGE (ML) INJECTION ONCE
Qty: 0 | Refills: 0 | Status: DISCONTINUED | OUTPATIENT
Start: 2018-01-19 | End: 2018-01-19

## 2018-01-19 RX ORDER — SODIUM CHLORIDE 9 MG/ML
150 INJECTION, SOLUTION INTRAVENOUS
Qty: 0 | Refills: 0 | Status: DISCONTINUED | OUTPATIENT
Start: 2018-01-19 | End: 2018-01-20

## 2018-01-19 RX ADMIN — SODIUM CHLORIDE 150 MILLILITER(S): 9 INJECTION, SOLUTION INTRAVENOUS at 10:25

## 2018-01-19 RX ADMIN — PANTOPRAZOLE SODIUM 40 MILLIGRAM(S): 20 TABLET, DELAYED RELEASE ORAL at 11:25

## 2018-01-19 RX ADMIN — SODIUM CHLORIDE 150 MILLILITER(S): 9 INJECTION, SOLUTION INTRAVENOUS at 06:30

## 2018-01-19 RX ADMIN — Medication 30 MILLIGRAM(S): at 10:22

## 2018-01-19 RX ADMIN — Medication 30 MILLIGRAM(S): at 11:30

## 2018-01-19 RX ADMIN — SODIUM CHLORIDE 200 MILLILITER(S): 9 INJECTION, SOLUTION INTRAVENOUS at 11:27

## 2018-01-19 NOTE — PROGRESS NOTE ADULT - SUBJECTIVE AND OBJECTIVE BOX
Patient is a 22y old  Male who presents with a chief complaint of abdominal pain (2018 01:32)      INTERVAL HPI/OVERNIGHT EVENTS: no acute overnight events    MEDICATIONS  (STANDING):  lactated ringers. 1000 milliLiter(s) (200 mL/Hr) IV Continuous <Continuous>  pantoprazole  Injectable 40 milliGRAM(s) IV Push daily    MEDICATIONS  (PRN):  morphine  - Injectable 2 milliGRAM(s) IV Push every 6 hours PRN Severe Pain (7 - 10)  morphine  - Injectable 1 milliGRAM(s) IV Push every 4 hours PRN Moderate Pain (4 - 6)      Allergies    No Known Allergies    Intolerances        REVIEW OF SYSTEMS:  c/o mild abdominal discomfort, denies any chest pain, fever, chills , n/v/d , cough,     Vital Signs Last 24 Hrs  T(C): 36.7 (2018 05:10), Max: 37 (2018 11:54)  T(F): 98 (2018 05:10), Max: 98.6 (2018 11:54)  HR: 58 (2018 05:10) (58 - 65)  BP: 121/59 (2018 05:10) (109/62 - 127/54)  BP(mean): --  RR: 17 (2018 05:10) (16 - 17)  SpO2: 100% (2018 05:10) (99% - 100%)    PHYSICAL EXAM:  GENERAL: NAD,   EYES: EOMI, PERRLA, conjunctiva and sclera clear  CHEST/LUNG: Clear to percussion bilaterally;  HEART: Regular rate and rhythm; No murmurs, rubs, or gallops  ABDOMEN: Soft, tender in epigastric region bs positive   NERVOUS SYSTEM:  Alert & Oriented X3, non focal  EXTREMITIES:  no edema well perfused  SKIN;    LABS:                        13.6   8.4   )-----------( 221      ( 2018 05:58 )             41.5     -    139  |  103  |  9   ----------------------------<  81  4.0   |  30  |  0.77    Ca    9.0      2018 07:17    TPro  6.7  /  Alb  3.4<L>  /  TBili  0.6  /  DBili  x   /  AST  82<H>  /  ALT  246<H>  /  AlkPhos  81  -      Urinalysis Basic - ( 2018 04:47 )    Color: Yellow / Appearance: Clear / S.010 / pH: x  Gluc: x / Ketone: Negative  / Bili: Negative / Urobili: Negative   Blood: x / Protein: Negative / Nitrite: Negative   Leuk Esterase: Negative / RBC: x / WBC x   Sq Epi: x / Non Sq Epi: x / Bacteria: x      CAPILLARY BLOOD GLUCOSE          RADIOLOGY & ADDITIONAL TESTS:    Imaging Personally Reviewed:  [ ] YES  [ ] NO    Consultant(s) Notes Reviewed:  [ ] YES  [ ] NO Patient is a 22y old  Male who presents with a chief complaint of abdominal pain (2018 01:32)      INTERVAL HPI/OVERNIGHT EVENTS: no acute overnight events    MEDICATIONS  (STANDING):  lactated ringers. 1000 milliLiter(s) (200 mL/Hr) IV Continuous <Continuous>  pantoprazole  Injectable 40 milliGRAM(s) IV Push daily    MEDICATIONS  (PRN):  morphine  - Injectable 2 milliGRAM(s) IV Push every 6 hours PRN Severe Pain (7 - 10)  morphine  - Injectable 1 milliGRAM(s) IV Push every 4 hours PRN Moderate Pain (4 - 6)    pantoprazole  Injectable 40 milliGRAM(s) IV Push daily    MEDICATIONS  (PRN):  morphine  - Injectable 2 milliGRAM(s) IV Push every 6 hours PRN Severe Pain (7 - 10)  morphine  - Injectable 1 milliGRAM(s) IV Push every 4 hours PRN Moderate Pain (4 - 6)      Allergies    No Known Allergies    Intolerances        REVIEW OF SYSTEMS:  c/o mild abdominal discomfort, denies any chest pain, fever, chills , n/v/d , cough,     Vital Signs Last 24 Hrs  T(C): 36.7 (2018 05:10), Max: 37 (2018 11:54)  T(F): 98 (2018 05:10), Max: 98.6 (2018 11:54)  HR: 58 (2018 05:10) (58 - 65)  BP: 121/59 (2018 05:10) (109/62 - 127/54)  BP(mean): --  RR: 17 (2018 05:10) (16 - 17)  SpO2: 100% (2018 05:10) (99% - 100%)    PHYSICAL EXAM:  GENERAL: NAD,   EYES: EOMI, PERRLA, conjunctiva and sclera clear  CHEST/LUNG: Clear to percussion bilaterally;  HEART: Regular rate and rhythm; No murmurs, rubs, or gallops  ABDOMEN: Soft, tender in epigastric region bs positive   NERVOUS SYSTEM:  Alert & Oriented X3, non focal  EXTREMITIES:  no edema well perfused  SKIN;    LABS:                        13.6   8.4   )-----------( 221      ( 2018 05:58 )             41.5     -    139  |  103  |  9   ----------------------------<  81  4.0   |  30  |  0.77    Ca    9.0      2018 07:17    TPro  6.7  /  Alb  3.4<L>  /  TBili  0.6  /  DBili  x   /  AST  82<H>  /  ALT  246<H>  /  AlkPhos  81  01-19      Urinalysis Basic - ( 2018 04:47 )    Color: Yellow / Appearance: Clear / S.010 / pH: x  Gluc: x / Ketone: Negative  / Bili: Negative / Urobili: Negative   Blood: x / Protein: Negative / Nitrite: Negative   Leuk Esterase: Negative / RBC: x / WBC x   Sq Epi: x / Non Sq Epi: x / Bacteria: x      CAPILLARY BLOOD GLUCOSE          RADIOLOGY & ADDITIONAL TESTS:    Imaging Personally Reviewed:  [ ] YES  [ ] NO    Consultant(s) Notes Reviewed:  [ ] YES  [ ] NO

## 2018-01-19 NOTE — CONSULT NOTE ADULT - ASSESSMENT
Biliary pancreatitis     No signs of obstruction on imaging and labs.   Suggest IOC during Lap kourtney.   Full consult to follow Biliary pancreatitis     No signs of obstruction on imaging and labs.   Advance diet as tolerated   IVF @ 200 cc / hour   Monitor for fluid overload   Suggest IOC during Lap kourtney.

## 2018-01-19 NOTE — CONSULT NOTE ADULT - ATTENDING COMMENTS
Agree with above  Gallstone pancreatitis. LFT and lipase improving  Plan for lap kourtney with cholangiogram on Mon 1/22/18

## 2018-01-19 NOTE — PROGRESS NOTE ADULT - PROBLEM SELECTOR PLAN 3
patient is not on any medication, needs outpatient follow up. stable; patient is not on any medication, needs outpatient follow up.

## 2018-01-19 NOTE — PROGRESS NOTE ADULT - PROBLEM SELECTOR PLAN 2
patient takes ranitidine for GERD, will start on Protonix IV Once daily. patient takes ranitidine for GERD, currently on Protonix IV daily.  Switch back to Ranitidine upon discharge

## 2018-01-19 NOTE — PROGRESS NOTE ADULT - ATTENDING COMMENTS
Patient was seen and examined by myself. Case was discussed with house staff in details. I have reviewed and agree with the plan as outlined above with edits where appropriate.  21 y/o M admitted for with severe epigastric abdominal pain radiating to back;   exam as above; clinically stable; NAD; abd- soft ND; mild epigastric tenderness. BS+  Vital Signs Last 24 Hrs  T(C): 36.9 (19 Jan 2018 21:26), Max: 36.9 (19 Jan 2018 21:26)  T(F): 98.4 (19 Jan 2018 21:26), Max: 98.4 (19 Jan 2018 21:26)  HR: 58 (19 Jan 2018 21:26) (58 - 63)  BP: 109/57 (19 Jan 2018 21:26) (109/57 - 121/59)  BP(mean): --  RR: 17 (19 Jan 2018 21:26) (17 - 17)  SpO2: 99% (19 Jan 2018 21:26) (98% - 100%)  A/P:   1. Acute gall stone pancreatitis- pain improved; clear liquid diet as tolerated; continue IV fluids; decrease to 150 cc/hour  2. acute pain syndrome- IV opioids PRN for pain  3. abnormal LFTs due to above disease  4. h/o depression- stable  discussed with surgery attending Dr Monge- plan for laparoscopic cholecystectomy in Monday.  NPO Sunday night.  Other plan as outlined above.  Plan discussed with patient in details at bedside and all t questions / concerns were addressed. I requested to speak with his sister or mother but patient declined .

## 2018-01-19 NOTE — CONSULT NOTE ADULT - SUBJECTIVE AND OBJECTIVE BOX
Patient is a 22y old  Male who presents with a chief complaint of abdominal pain (18 Jan 2018 01:32)    HPI: 22y Male  presents with a chief complaint of         . The patient has a significant past medical history of           .     REVIEW OF SYSTEMS  Constitutional:   No fever, no fatigue, no pallor, no night sweats, no weight loss.  HEENT:   No eye pain, no vision changes, no icterus, no mouth ulcers.  Respiratory:   No shortness of breath, no cough, no respiratory distress.   Cardiovascular:   No chest pain, no palpitations.   Gastrointestinal: No abdominal pain, no nausea, no vomiting , no diahrrea, no constipation, no hematochezia,no melena.  Skin:   No rashes, no jaundice, no eczema.   Musculoskeletal:   No joint pain, no swelling, no myalgia.   Neurologic:   No headache, no seizure, no weakness.   Genitourinary:   No dysuria, no decreased urine output.  Psychiatric:  No depression, no anxiety,   Endocrine:   No thyroid disease, no diabetes.  Heme/Lymphatic:   No anemia, no blood transfusions, no lymph node enlargement, no bleeding, no bruising.  ___________________________________________________________________________________________  Allergies    No Known Allergies    Intolerances      MEDICATIONS  (STANDING):  lactated ringers. 1000 milliLiter(s) (200 mL/Hr) IV Continuous <Continuous>  pantoprazole  Injectable 40 milliGRAM(s) IV Push daily    MEDICATIONS  (PRN):  morphine  - Injectable 2 milliGRAM(s) IV Push every 6 hours PRN Severe Pain (7 - 10)  morphine  - Injectable 1 milliGRAM(s) IV Push every 4 hours PRN Moderate Pain (4 - 6)      PAST MEDICAL & SURGICAL HISTORY:  GERD (gastroesophageal reflux disease)  Depression  Anxiety  No significant past surgical history    FAMILY HISTORY:  No pertinent family history in first degree relatives    Social History: No hsitory of : Tobacco use, IVDA, EToH  ______________________________________________________________________________________    PHYSICAL EXAM    Daily     Daily   BMI: 21.1 (01-17 @ 19:44)  Change in Weight:  Vital Signs Last 24 Hrs  T(C): 36.7 (19 Jan 2018 05:10), Max: 37 (18 Jan 2018 21:08)  T(F): 98 (19 Jan 2018 05:10), Max: 98.6 (18 Jan 2018 21:08)  HR: 58 (19 Jan 2018 05:10) (58 - 65)  BP: 121/59 (19 Jan 2018 05:10) (119/62 - 127/54)  BP(mean): --  RR: 17 (19 Jan 2018 05:10) (16 - 17)  SpO2: 100% (19 Jan 2018 05:10) (99% - 100%)    General:  Well developed, well nourished, alert and active, no pallor, NAD.  HEENT:    Normal appearance of conjunctiva, ears, nose, lips, oropharynx, and oral mucosa, anicteric.  Neck:  No masses, no asymmetry.  Lymph Nodes:  No lymphadenopathy.   Cardiovascular:  RRR normal S1/S2, no murmur.  Respiratory:  CTA B/L, normal respiratory effort.   Abdominal:   soft, no masses or tenderness, normoactive BS, NT/ND, no HSM.  Extremities:   No clubbing or cyanosis, normal capillary refill, no edema.   Skin:   No rash, jaundice, lesions, eczema.   Musculoskeletal:  No joint swelling, erythema or tenderness.   Neuro: No focal deficits.   Other:   _______________________________________________________________________________________________  Lab Results:                          13.6   8.4   )-----------( 221      ( 18 Jan 2018 05:58 )             41.5     01-19    139  |  103  |  9   ----------------------------<  81  4.0   |  30  |  0.77    Ca    9.0      19 Jan 2018 07:17    TPro  6.7  /  Alb  3.4<L>  /  TBili  0.6  /  DBili  x   /  AST  82<H>  /  ALT  246<H>  /  AlkPhos  81  01-19    LIVER FUNCTIONS - ( 19 Jan 2018 07:17 )  Alb: 3.4 g/dL / Pro: 6.7 g/dL / ALK PHOS: 81 U/L / ALT: 246 U/L DA / AST: 82 U/L / GGT: x                   Stool Results:          RADIOLOGY RESULTS:    SURGICAL PATHOLOGY: Patient is a 22y old  Male who presents with a chief complaint of abdominal pain (18 Jan 2018 01:32)    HPI: 22y Male  presents with a chief complaint of   Ruq/ Epigastric [pain 9/10 with no radiation X 3 days.     REVIEW OF SYSTEMS  Constitutional:   No fever, no fatigue, no pallor, no night sweats, no weight loss.  HEENT:   No eye pain, no vision changes, no icterus, no mouth ulcers.  Respiratory:   No shortness of breath, no cough, no respiratory distress.   Cardiovascular:   No chest pain, no palpitations.   Gastrointestinal: + abdominal pain, no nausea, no vomiting , no diarrhea no constipation, no hematochezia, no melena.  Skin:   No rashes, no jaundice, no eczema.   Musculoskeletal:   No joint pain, no swelling, no myalgia.   Neurologic:   No headache, no seizure, no weakness.   Genitourinary:   No dysuria, no decreased urine output.  Psychiatric:  No depression, no anxiety,   Endocrine:   No thyroid disease, no diabetes.  Heme/Lymphatic:   No anemia, no blood transfusions, no lymph node enlargement, no bleeding, no bruising.  ___________________________________________________________________________________________  Allergies    No Known Allergies    Intolerances      MEDICATIONS  (STANDING):  lactated ringers. 1000 milliLiter(s) (200 mL/Hr) IV Continuous <Continuous>  pantoprazole  Injectable 40 milliGRAM(s) IV Push daily    MEDICATIONS  (PRN):  morphine  - Injectable 2 milliGRAM(s) IV Push every 6 hours PRN Severe Pain (7 - 10)  morphine  - Injectable 1 milliGRAM(s) IV Push every 4 hours PRN Moderate Pain (4 - 6)      PAST MEDICAL & SURGICAL HISTORY:  GERD (gastroesophageal reflux disease)  Depression  Anxiety  No significant past surgical history    FAMILY HISTORY:  No pertinent family history in first degree relatives    Social History: No history of : Tobacco use, IVDA, EToH  ______________________________________________________________________________________    PHYSICAL EXAM    Daily     Daily   BMI: 21.1 (01-17 @ 19:44)  Change in Weight:  Vital Signs Last 24 Hrs  T(C): 36.7 (19 Jan 2018 05:10), Max: 37 (18 Jan 2018 21:08)  T(F): 98 (19 Jan 2018 05:10), Max: 98.6 (18 Jan 2018 21:08)  HR: 58 (19 Jan 2018 05:10) (58 - 65)  BP: 121/59 (19 Jan 2018 05:10) (119/62 - 127/54)  BP(mean): --  RR: 17 (19 Jan 2018 05:10) (16 - 17)  SpO2: 100% (19 Jan 2018 05:10) (99% - 100%)    General:  Well developed, well nourished, alert and active, no pallor, NAD.  HEENT:    Normal appearance of conjunctiva, ears, nose, lips, oropharynx, and oral mucosa, anicteric.  Neck:  No masses, no asymmetry.  Lymph Nodes:  No lymphadenopathy.   Cardiovascular:  RRR normal S1/S2, no murmur.  Respiratory:  CTA B/L, normal respiratory effort.   Abdominal:  Mild tenderness epigastric region   Extremities:   No clubbing or cyanosis, normal capillary refill, no edema.   Skin:   No rash, jaundice, lesions, eczema.   Musculoskeletal:  No joint swelling, erythema or tenderness.   Neuro: No focal deficits.   Other:   _______________________________________________________________________________________________  Lab Results:                          13.6   8.4   )-----------( 221      ( 18 Jan 2018 05:58 )             41.5     01-19    139  |  103  |  9   ----------------------------<  81  4.0   |  30  |  0.77    Ca    9.0      19 Jan 2018 07:17    TPro  6.7  /  Alb  3.4<L>  /  TBili  0.6  /  DBili  x   /  AST  82<H>  /  ALT  246<H>  /  AlkPhos  81  01-19    LIVER FUNCTIONS - ( 19 Jan 2018 07:17 )  Alb: 3.4 g/dL / Pro: 6.7 g/dL / ALK PHOS: 81 U/L / ALT: 246 U/L DA / AST: 82 U/L / GGT: x                   Stool Results:          RADIOLOGY RESULTS:    SURGICAL PATHOLOGY:

## 2018-01-19 NOTE — CONSULT NOTE ADULT - SUBJECTIVE AND OBJECTIVE BOX
HPI:  21 y/o M PMHx of Anxiety and Depression ( was on Lexapro and alprazolam Hx of suicide attempts X2, GERD presented to ED with c/o abdominal pain since 3 days. Patient started to have epigastric abdominal pain starting july 2017 saw PCP in Dec who prescribed ranitidine for gastritis, and symptoms were under control, until 3 days back when abdominal pain got worse- describes as epigastric, intermittent, lasting for 2 hours, burning and constricting type, radiating to back, 6 to 9/10 intensity, used Tylenol once without relief and has been using ranitidine without any resolution of pain, aggravated with intake of spicy foods, no relieving factors, associated with nausea. Patient denies vomiting, fever, chills, diarrhea, constipation or any other complaints.  PSH- none  FH- not significant  allergies- NKDA  Social Hx- exsmoker, quit 1 year ago, smoked 1 cigar/ 1.5 years, drinks alcohol socially ( vodka/ rum), last drink was in sept 2017, used marijuana in the past, never been sexually active.    In ED, patient had stable vitals. CT abdomen s/o normal study, no evidence of pancreatitis.  Labs pertinent for WBC- 13.6, AST/ ALT of 291/155, [place] [address], s/p 1L NS bolus, 1 dose each of Pepcid, Zofran,  morphine in ED.  admitted to medicine for management of  pancreatitis  RUQ sono- Multiple small gallstones identified. No gallbladder wall   thickening or pericholecystic fluid identified. No intrahepatic or   extrahepatic biliary duct dilatation.        PAST MEDICAL & SURGICAL HISTORY:  GERD (gastroesophageal reflux disease)  Depression  Anxiety  No significant past surgical history      Vital Signs Last 24 Hrs  T(C): 37 (18 Jan 2018 21:08), Max: 37 (18 Jan 2018 11:54)  T(F): 98.6 (18 Jan 2018 21:08), Max: 98.6 (18 Jan 2018 11:54)  HR: 64 (18 Jan 2018 21:08) (55 - 65)  BP: 127/54 (18 Jan 2018 21:08) (109/54 - 127/54)  BP(mean): --  RR: 16 (18 Jan 2018 21:08) (16 - 17)  SpO2: 100% (18 Jan 2018 21:08) (99% - 100%)                          13.6   8.4   )-----------( 221      ( 18 Jan 2018 05:58 )             41.5     01-18    140  |  105  |  10  ----------------------------<  86  4.1   |  29  |  0.83    Ca    8.6      18 Jan 2018 05:58    TPro  6.7  /  Alb  3.5  /  TBili  0.3  /  DBili  0.1  /  AST  500<H>  /  ALT  445<H>  /  AlkPhos  76  01-18    Lipase, Serum (01.18.18 @ 05:58)    Lipase, Serum: 6747 U/L        PHYSICAL EXAM  Abdomen:    ASSESSMENT/ PLAN: HPI:  21 y/o M PMHx of Anxiety and Depression ( was on Lexapro and alprazolam Hx of suicide attempts X2, GERD presented to ED with c/o abdominal pain since 3 days. Patient started to have epigastric abdominal pain starting july 2017 saw PCP in Dec who prescribed ranitidine for gastritis, and symptoms were under control, until 3 days back when abdominal pain got worse- describes as epigastric, intermittent, lasting for 2 hours, burning and constricting type, radiating to back, 6 to 9/10 intensity, used Tylenol once without relief and has been using ranitidine without any resolution of pain, aggravated with intake of spicy foods, no relieving factors, associated with nausea. Patient denies vomiting, fever, chills, diarrhea, constipation or any other complaints.  PSH- none  FH- not significant  allergies- NKDA  Social Hx- exsmoker, quit 1 year ago, smoked 1 cigar/ 1.5 years, drinks alcohol socially ( vodka/ rum), last drink was in sept 2017, used marijuana in the past, never been sexually active.    In ED, patient had stable vitals. CT abdomen s/o normal study, no evidence of pancreatitis.  Labs pertinent for WBC- 13.6, AST/ ALT of 291/155, [place] of 16270, s/p 1L NS bolus, 1 dose each of Pepcid, Zofran,  morphine in ED.  admitted to medicine for management of  pancreatitis  RUQ sono- Multiple small gallstones identified. No gallbladder wall   thickening or pericholecystic fluid identified. No intrahepatic or   extrahepatic biliary duct dilatation.        PAST MEDICAL & SURGICAL HISTORY:  GERD (gastroesophageal reflux disease)  Depression  Anxiety  No significant past surgical history      Vital Signs Last 24 Hrs  T(C): 37 (18 Jan 2018 21:08), Max: 37 (18 Jan 2018 11:54)  T(F): 98.6 (18 Jan 2018 21:08), Max: 98.6 (18 Jan 2018 11:54)  HR: 64 (18 Jan 2018 21:08) (55 - 65)  BP: 127/54 (18 Jan 2018 21:08) (109/54 - 127/54)  BP(mean): --  RR: 16 (18 Jan 2018 21:08) (16 - 17)  SpO2: 100% (18 Jan 2018 21:08) (99% - 100%)                          13.6   8.4   )-----------( 221      ( 18 Jan 2018 05:58 )             41.5     01-18    140  |  105  |  10  ----------------------------<  86  4.1   |  29  |  0.83    Ca    8.6      18 Jan 2018 05:58    TPro  6.7  /  Alb  3.5  /  TBili  0.3  /  DBili  0.1  /  AST  500<H>  /  ALT  445<H>  /  AlkPhos  76  01-18    Lipase, Serum (01.18.18 @ 05:58)    Lipase, Serum: 6747 U/L        PHYSICAL EXAM  NAD  alert, oriented  Abdomen: soft, tender generalized but more so epigastric, no rebound or guarding  ext no c/c/e    ASSESSMENT/ PLAN:  cont NPO, hydrate, trend amylase/lipase, poss pre-op for lap kourtney when resolving pancreatitis  d/w Dr Monge

## 2018-01-20 DIAGNOSIS — R74.0 NONSPECIFIC ELEVATION OF LEVELS OF TRANSAMINASE AND LACTIC ACID DEHYDROGENASE [LDH]: ICD-10-CM

## 2018-01-20 LAB
ALBUMIN SERPL ELPH-MCNC: 3.3 G/DL — LOW (ref 3.5–5)
ALP SERPL-CCNC: 71 U/L — SIGNIFICANT CHANGE UP (ref 40–120)
ALT FLD-CCNC: 151 U/L DA — HIGH (ref 10–60)
ANION GAP SERPL CALC-SCNC: 5 MMOL/L — SIGNIFICANT CHANGE UP (ref 5–17)
AST SERPL-CCNC: 27 U/L — SIGNIFICANT CHANGE UP (ref 10–40)
BILIRUB SERPL-MCNC: 0.5 MG/DL — SIGNIFICANT CHANGE UP (ref 0.2–1.2)
BUN SERPL-MCNC: 8 MG/DL — SIGNIFICANT CHANGE UP (ref 7–18)
CALCIUM SERPL-MCNC: 8.9 MG/DL — SIGNIFICANT CHANGE UP (ref 8.4–10.5)
CHLORIDE SERPL-SCNC: 105 MMOL/L — SIGNIFICANT CHANGE UP (ref 96–108)
CO2 SERPL-SCNC: 30 MMOL/L — SIGNIFICANT CHANGE UP (ref 22–31)
CREAT SERPL-MCNC: 0.86 MG/DL — SIGNIFICANT CHANGE UP (ref 0.5–1.3)
GLUCOSE SERPL-MCNC: 90 MG/DL — SIGNIFICANT CHANGE UP (ref 70–99)
IGG SERPL-MCNC: 1082 MG/DL — SIGNIFICANT CHANGE UP (ref 700–1600)
IGG1 SER-MCNC: 571 MG/DL — SIGNIFICANT CHANGE UP (ref 248–810)
IGG2 SER-MCNC: 391 MG/DL — SIGNIFICANT CHANGE UP (ref 130–555)
IGG3 SER-MCNC: 54 MG/DL — SIGNIFICANT CHANGE UP (ref 15–102)
IGG4 SER-MCNC: 109 MG/DL — HIGH (ref 2–96)
PHOSPHATE SERPL-MCNC: 4.1 MG/DL — SIGNIFICANT CHANGE UP (ref 2.5–4.5)
POTASSIUM SERPL-MCNC: 4 MMOL/L — SIGNIFICANT CHANGE UP (ref 3.5–5.3)
POTASSIUM SERPL-SCNC: 4 MMOL/L — SIGNIFICANT CHANGE UP (ref 3.5–5.3)
PROT SERPL-MCNC: 6.3 G/DL — SIGNIFICANT CHANGE UP (ref 6–8.3)
SODIUM SERPL-SCNC: 140 MMOL/L — SIGNIFICANT CHANGE UP (ref 135–145)

## 2018-01-20 PROCEDURE — 99233 SBSQ HOSP IP/OBS HIGH 50: CPT

## 2018-01-20 RX ORDER — SODIUM CHLORIDE 9 MG/ML
1000 INJECTION, SOLUTION INTRAVENOUS
Qty: 0 | Refills: 0 | Status: DISCONTINUED | OUTPATIENT
Start: 2018-01-20 | End: 2018-01-21

## 2018-01-20 RX ORDER — TRAMADOL HYDROCHLORIDE 50 MG/1
50 TABLET ORAL ONCE
Qty: 0 | Refills: 0 | Status: DISCONTINUED | OUTPATIENT
Start: 2018-01-20 | End: 2018-01-20

## 2018-01-20 RX ADMIN — TRAMADOL HYDROCHLORIDE 50 MILLIGRAM(S): 50 TABLET ORAL at 06:24

## 2018-01-20 RX ADMIN — PANTOPRAZOLE SODIUM 40 MILLIGRAM(S): 20 TABLET, DELAYED RELEASE ORAL at 12:55

## 2018-01-20 RX ADMIN — TRAMADOL HYDROCHLORIDE 50 MILLIGRAM(S): 50 TABLET ORAL at 05:50

## 2018-01-20 RX ADMIN — SODIUM CHLORIDE 150 MILLILITER(S): 9 INJECTION, SOLUTION INTRAVENOUS at 03:14

## 2018-01-20 RX ADMIN — SODIUM CHLORIDE 150 MILLILITER(S): 9 INJECTION, SOLUTION INTRAVENOUS at 12:56

## 2018-01-20 NOTE — CHART NOTE - NSCHARTNOTEFT_GEN_A_CORE
Pt pre-op for lap kourtney with IOC 1/22/18  comfortable  pain much improved  no n/v  benoit liquids    Vital Signs Last 24 Hrs  T(C): 36.6 (20 Jan 2018 05:15), Max: 36.9 (19 Jan 2018 21:26)  T(F): 97.9 (20 Jan 2018 05:15), Max: 98.4 (19 Jan 2018 21:26)  HR: 57 (20 Jan 2018 05:15) (57 - 63)  BP: 110/61 (20 Jan 2018 05:15) (109/57 - 117/72)  BP(mean): --  RR: 16 (20 Jan 2018 05:15) (16 - 17)  SpO2: 99% (20 Jan 2018 05:15) (98% - 99%)    01-20    140  |  105  |  8   ----------------------------<  90  4.0   |  30  |  0.86    Ca    8.9      20 Jan 2018 08:08  Phos  4.1     01-20    TPro  6.3  /  Alb  3.3<L>  /  TBili  0.5  /  DBili  x   /  AST  27  /  ALT  151<H>  /  AlkPhos  71  01-20    Lipase, Serum: 213 U/L (01.19.18 @ 07:17)  Amylase, Serum Total: 617 U/L (01.18.18 @ 05:58)    abd soft, NT    cont present mgmt  pre-op for Monday 1/22  NPO after midnight sunday night

## 2018-01-20 NOTE — PROGRESS NOTE ADULT - PROBLEM SELECTOR PLAN 3
patient takes ranitidine for GERD, currently on Protonix IV daily.  Switch back to Ranitidine upon discharge

## 2018-01-20 NOTE — PROGRESS NOTE ADULT - SUBJECTIVE AND OBJECTIVE BOX
MEDICAL ATTENDING NOTE: Attending Medicine Covering Dr. Arenas    Patient is a 22y old  Male who presents with a chief complaint of abdominal pain (18 Jan 2018 01:32)      INTERVAL HPI/OVERNIGHT EVENTS: no new complaints; Patient doing better with less abdominal pain and no new nausea or vomiting    MEDICATIONS  (STANDING):  lactated ringers. 1000 milliLiter(s) (150 mL/Hr) IV Continuous <Continuous>  pantoprazole  Injectable 40 milliGRAM(s) IV Push daily    MEDICATIONS  (PRN):  morphine  - Injectable 2 milliGRAM(s) IV Push every 6 hours PRN Severe Pain (7 - 10)  morphine  - Injectable 1 milliGRAM(s) IV Push every 4 hours PRN Moderate Pain (4 - 6)      __________________________________________________  REVIEW OF SYSTEMS:    CONSTITUTIONAL: No fever,   EYES: no acute visual disturbances  NECK: No pain or stiffness  RESPIRATORY: No cough; No shortness of breath  CARDIOVASCULAR: No chest pain, no palpitations  GASTROINTESTINAL: Mild abdominal pain. No nausea or vomiting; No diarrhea   NEUROLOGICAL: No headache or numbness, no tremors  MUSCULOSKELETAL: No joint pain, no muscle pain  GENITOURINARY: no dysuria, no frequency, no hesitancy  PSYCHIATRY: no depression , no anxiety  ALL OTHER  ROS negative        Vital Signs Last 24 Hrs  T(C): 36.8 (20 Jan 2018 14:05), Max: 36.9 (19 Jan 2018 21:26)  T(F): 98.3 (20 Jan 2018 14:05), Max: 98.4 (19 Jan 2018 21:26)  HR: 65 (20 Jan 2018 14:05) (57 - 65)  BP: 112/55 (20 Jan 2018 14:05) (109/57 - 112/55)  RR: 16 (20 Jan 2018 14:05) (16 - 17)  SpO2: 100% (20 Jan 2018 14:05) (99% - 100%)    ________________________________________________  PHYSICAL EXAM:  GENERAL: NAD  HEENT: Normocephalic;  conjunctivae and sclerae clear; moist mucous membranes;   NECK : supple  CHEST/LUNG: Clear to auscultation bilaterally with good air entry   HEART: S1 S2  regular; no murmurs, gallops or rubs  ABDOMEN: Soft,  Mild tender RUQ/ Epigastric area, Nondistended; Bowel sounds present  EXTREMITIES: no cyanosis; no edema; no calf tenderness; keloids forearm  SKIN: warm and dry; no rash  NERVOUS SYSTEM:  Awake and alert; Oriented  to place, person and time ; no new deficits    _________________________________________________  LABS:    01-20    140  |  105  |  8   ----------------------------<  90  4.0   |  30  |  0.86    Ca    8.9      20 Jan 2018 08:08  Phos  4.1     01-20    TPro  6.3  /  Alb  3.3<L>  /  TBili  0.5  /  DBili  x   /  AST  27  /  ALT  151<H>  /  AlkPhos  71  01-20      Consultant(s) Notes Reviewed:   YES; GI/ Surgery    Plan of care was discussed with patient and /or primary care giver; all questions and concerns were addressed and care was aligned with patient's wishes.

## 2018-01-20 NOTE — PROGRESS NOTE ADULT - ATTENDING COMMENTS
As per Patient still in [facility]. Lives with family. As per Patient his family is aware of his current diagnosis and plan for surgery on Monday.

## 2018-01-21 LAB
APTT BLD: 31.1 SEC — SIGNIFICANT CHANGE UP (ref 27.5–37.4)
BASOPHILS # BLD AUTO: 0.1 K/UL — SIGNIFICANT CHANGE UP (ref 0–0.2)
BASOPHILS NFR BLD AUTO: 1.2 % — SIGNIFICANT CHANGE UP (ref 0–2)
EOSINOPHIL # BLD AUTO: 0.2 K/UL — SIGNIFICANT CHANGE UP (ref 0–0.5)
EOSINOPHIL NFR BLD AUTO: 4.2 % — SIGNIFICANT CHANGE UP (ref 0–6)
HCT VFR BLD CALC: 39.4 % — SIGNIFICANT CHANGE UP (ref 39–50)
HGB BLD-MCNC: 13.2 G/DL — SIGNIFICANT CHANGE UP (ref 13–17)
INR BLD: 1.08 RATIO — SIGNIFICANT CHANGE UP (ref 0.88–1.16)
LYMPHOCYTES # BLD AUTO: 3.2 K/UL — SIGNIFICANT CHANGE UP (ref 1–3.3)
LYMPHOCYTES # BLD AUTO: 53.9 % — HIGH (ref 13–44)
MAGNESIUM SERPL-MCNC: 2 MG/DL — SIGNIFICANT CHANGE UP (ref 1.6–2.6)
MCHC RBC-ENTMCNC: 32 PG — SIGNIFICANT CHANGE UP (ref 27–34)
MCHC RBC-ENTMCNC: 33.4 GM/DL — SIGNIFICANT CHANGE UP (ref 32–36)
MCV RBC AUTO: 95.8 FL — SIGNIFICANT CHANGE UP (ref 80–100)
MONOCYTES # BLD AUTO: 0.5 K/UL — SIGNIFICANT CHANGE UP (ref 0–0.9)
MONOCYTES NFR BLD AUTO: 8.8 % — SIGNIFICANT CHANGE UP (ref 2–14)
NEUTROPHILS # BLD AUTO: 1.9 K/UL — SIGNIFICANT CHANGE UP (ref 1.8–7.4)
NEUTROPHILS NFR BLD AUTO: 32 % — LOW (ref 43–77)
PLATELET # BLD AUTO: 204 K/UL — SIGNIFICANT CHANGE UP (ref 150–400)
PROTHROM AB SERPL-ACNC: 11.8 SEC — SIGNIFICANT CHANGE UP (ref 9.8–12.7)
RBC # BLD: 4.11 M/UL — LOW (ref 4.2–5.8)
RBC # FLD: 11.5 % — SIGNIFICANT CHANGE UP (ref 10.3–14.5)
WBC # BLD: 5.9 K/UL — SIGNIFICANT CHANGE UP (ref 3.8–10.5)
WBC # FLD AUTO: 5.9 K/UL — SIGNIFICANT CHANGE UP (ref 3.8–10.5)

## 2018-01-21 PROCEDURE — 99233 SBSQ HOSP IP/OBS HIGH 50: CPT | Mod: GC

## 2018-01-21 RX ORDER — SODIUM CHLORIDE 9 MG/ML
1000 INJECTION, SOLUTION INTRAVENOUS
Qty: 0 | Refills: 0 | Status: DISCONTINUED | OUTPATIENT
Start: 2018-01-21 | End: 2018-01-23

## 2018-01-21 RX ADMIN — PANTOPRAZOLE SODIUM 40 MILLIGRAM(S): 20 TABLET, DELAYED RELEASE ORAL at 12:42

## 2018-01-21 RX ADMIN — SODIUM CHLORIDE 125 MILLILITER(S): 9 INJECTION, SOLUTION INTRAVENOUS at 18:15

## 2018-01-21 NOTE — PROGRESS NOTE ADULT - SUBJECTIVE AND OBJECTIVE BOX
SURGERY PRE-OP NOTE      Dx: Gallstone Pancreatitis  Sx: Lap Kira with Dr. Monge      Labs:                       13.2   5.9   )-----------( 204      ( 21 Jan 2018 07:08 )             39.4   01-20    140  |  105  |  8   ----------------------------<  90  4.0   |  30  |  0.86    Ca    8.9      20 Jan 2018 08:08  Phos  4.1     01-20  Mg     2.0     01-21    TPro  6.3  /  Alb  3.3<L>  /  TBili  0.5  /  DBili  x   /  AST  27  /  ALT  151<H>  /  AlkPhos  71  01-20      PT/INR - ( 21 Jan 2018 07:08 )   PT: 11.8 sec;   INR: 1.08 ratio    PTT - ( 21 Jan 2018 07:08 )  PTT:31.1 sec      ABO RH Interpretation: A POS: 01/21/2018 7:21  SCHEUNG  Attention: Second specimen is required for ABORH Confirmation. (01.21.18 @ 06:51)    CXR: < from: Xray Chest 1 View AP/PA (01.18.18 @ 07:59) >  No evidence for focal infiltrate or lobar consolidation.   Hilar morphology and contour is appear unchanged, with mild prominence of   the left hilar region with comparison to the right, indeterminate   clinical significance. Contrast-enhanced CT evaluation of the chest can   be performed for further characterization.

## 2018-01-21 NOTE — PROGRESS NOTE ADULT - ATTENDING COMMENTS
Discussed with patient plan for OR; Agrees. Sx follow up appreciated  NPO after Midnight. Labs stable  Patient encouraged to ambulate  Discussed with RN    ***Patient is Optimized for the planned Laparoscopic Cholecystectomy at low risk> No further work needed at this time*** Discussed with patient plan for OR; Agrees. Sx follow up appreciated  NPO after Midnight. Labs stable  Patient encouraged to ambulate  Discussed with RN  Decrease IVF to 125 cc/hr; patient is stable clinically    ***Patient is Optimized for the planned Laparoscopic Cholecystectomy at low risk> No further work needed at this time***

## 2018-01-21 NOTE — PROGRESS NOTE ADULT - SUBJECTIVE AND OBJECTIVE BOX
MEDICAL ATTENDING NOTE  Patient is a 22y old  Male who presents with a chief complaint of abdominal pain (18 Jan 2018 01:32)      INTERVAL HPI/OVERNIGHT EVENTS: no new complaints; Abdominal pain controlled    MEDICATIONS  (STANDING):  lactated ringers. 1000 milliLiter(s) (150 mL/Hr) IV Continuous <Continuous>  pantoprazole  Injectable 40 milliGRAM(s) IV Push daily    MEDICATIONS  (PRN):  morphine  - Injectable 2 milliGRAM(s) IV Push every 6 hours PRN Severe Pain (7 - 10)  morphine  - Injectable 1 milliGRAM(s) IV Push every 4 hours PRN Moderate Pain (4 - 6)      __________________________________________________  REVIEW OF SYSTEMS:    CONSTITUTIONAL: No fever,   EYES: no acute visual disturbances  NECK: No pain or stiffness  RESPIRATORY: No cough; No shortness of breath  CARDIOVASCULAR: No chest pain, no palpitations  GASTROINTESTINAL: pain. No nausea or vomiting; No diarrhea   NEUROLOGICAL: No headache or numbness, no tremors  MUSCULOSKELETAL: No joint pain, no muscle pain  GENITOURINARY: no dysuria, no frequency, no hesitancy  PSYCHIATRY: no depression , no anxiety  ALL OTHER  ROS negative        Vital Signs Last 24 Hrs  T(C): 36.7 (21 Jan 2018 05:31), Max: 36.9 (20 Jan 2018 20:47)  T(F): 98 (21 Jan 2018 05:31), Max: 98.4 (20 Jan 2018 20:47)  HR: 52 (21 Jan 2018 05:31) (52 - 65)  BP: 110/62 (21 Jan 2018 05:31) (110/62 - 129/69)  RR: 16 (21 Jan 2018 05:31) (16 - 16)  SpO2: 98% (21 Jan 2018 05:31) (98% - 100%)    ________________________________________________  PHYSICAL EXAM:  GENERAL: NAD  HEENT: Normocephalic;  conjunctivae and sclerae clear; moist mucous membranes;   NECK : supple  CHEST/LUNG: Clear to auscultation bilaterally with good air entry   HEART: S1 S2  regular; no murmurs, gallops or rubs  ABDOMEN: Soft, very mild tender, Nondistended; Bowel sounds present  EXTREMITIES: no cyanosis; no edema; no calf tenderness  SKIN: warm and dry; no rash  NERVOUS SYSTEM:  Awake and alert; Oriented  to place, person and time ; no new deficits    _________________________________________________  LABS:                        13.2   5.9   )-----------( 204      ( 21 Jan 2018 07:08 )             39.4     01-20    140  |  105  |  8   ----------------------------<  90  4.0   |  30  |  0.86    Ca    8.9      20 Jan 2018 08:08  Phos  4.1     01-20  Mg     2.0     01-21    TPro  6.3  /  Alb  3.3<L>  /  TBili  0.5  /  DBili  x   /  AST  27  /  ALT  151<H>  /  AlkPhos  71  01-20    PT/INR - ( 21 Jan 2018 07:08 )   PT: 11.8 sec;   INR: 1.08 ratio     PTT - ( 21 Jan 2018 07:08 )  PTT:31.1 sec      RADIOLOGY & ADDITIONAL TESTS: No new    Consultant(s) Notes Reviewed:   YES    Care Discussed with Consultants : Surgery    Plan of care was discussed with patient and /or primary care giver; all questions and concerns were addressed and care was aligned with patient's wishes.

## 2018-01-22 ENCOUNTER — TRANSCRIPTION ENCOUNTER (OUTPATIENT)
Age: 23
End: 2018-01-22

## 2018-01-22 ENCOUNTER — RESULT REVIEW (OUTPATIENT)
Age: 23
End: 2018-01-22

## 2018-01-22 LAB
ALBUMIN SERPL ELPH-MCNC: 3.6 G/DL — SIGNIFICANT CHANGE UP (ref 3.5–5)
ALP SERPL-CCNC: 69 U/L — SIGNIFICANT CHANGE UP (ref 40–120)
ALT FLD-CCNC: 113 U/L DA — HIGH (ref 10–60)
ANION GAP SERPL CALC-SCNC: 6 MMOL/L — SIGNIFICANT CHANGE UP (ref 5–17)
APTT BLD: 30.1 SEC — SIGNIFICANT CHANGE UP (ref 27.5–37.4)
AST SERPL-CCNC: 27 U/L — SIGNIFICANT CHANGE UP (ref 10–40)
BILIRUB SERPL-MCNC: 0.7 MG/DL — SIGNIFICANT CHANGE UP (ref 0.2–1.2)
BUN SERPL-MCNC: 5 MG/DL — LOW (ref 7–18)
CALCIUM SERPL-MCNC: 9 MG/DL — SIGNIFICANT CHANGE UP (ref 8.4–10.5)
CHLORIDE SERPL-SCNC: 106 MMOL/L — SIGNIFICANT CHANGE UP (ref 96–108)
CO2 SERPL-SCNC: 28 MMOL/L — SIGNIFICANT CHANGE UP (ref 22–31)
CREAT SERPL-MCNC: 0.89 MG/DL — SIGNIFICANT CHANGE UP (ref 0.5–1.3)
GLUCOSE SERPL-MCNC: 88 MG/DL — SIGNIFICANT CHANGE UP (ref 70–99)
INR BLD: 1.09 RATIO — SIGNIFICANT CHANGE UP (ref 0.88–1.16)
MAGNESIUM SERPL-MCNC: 2.1 MG/DL — SIGNIFICANT CHANGE UP (ref 1.6–2.6)
PHOSPHATE SERPL-MCNC: 4.7 MG/DL — HIGH (ref 2.5–4.5)
POTASSIUM SERPL-MCNC: 3.7 MMOL/L — SIGNIFICANT CHANGE UP (ref 3.5–5.3)
POTASSIUM SERPL-SCNC: 3.7 MMOL/L — SIGNIFICANT CHANGE UP (ref 3.5–5.3)
PROT SERPL-MCNC: 6.7 G/DL — SIGNIFICANT CHANGE UP (ref 6–8.3)
PROTHROM AB SERPL-ACNC: 11.9 SEC — SIGNIFICANT CHANGE UP (ref 9.8–12.7)
SODIUM SERPL-SCNC: 140 MMOL/L — SIGNIFICANT CHANGE UP (ref 135–145)

## 2018-01-22 PROCEDURE — 99233 SBSQ HOSP IP/OBS HIGH 50: CPT | Mod: GC

## 2018-01-22 PROCEDURE — 47563 LAPARO CHOLECYSTECTOMY/GRAPH: CPT | Mod: GC

## 2018-01-22 PROCEDURE — 88304 TISSUE EXAM BY PATHOLOGIST: CPT | Mod: 26

## 2018-01-22 RX ORDER — SODIUM CHLORIDE 9 MG/ML
1000 INJECTION, SOLUTION INTRAVENOUS
Qty: 0 | Refills: 0 | Status: DISCONTINUED | OUTPATIENT
Start: 2018-01-22 | End: 2018-01-22

## 2018-01-22 RX ORDER — FENTANYL CITRATE 50 UG/ML
25 INJECTION INTRAVENOUS
Qty: 0 | Refills: 0 | Status: DISCONTINUED | OUTPATIENT
Start: 2018-01-22 | End: 2018-01-22

## 2018-01-22 RX ORDER — OXYCODONE AND ACETAMINOPHEN 5; 325 MG/1; MG/1
1 TABLET ORAL EVERY 4 HOURS
Qty: 0 | Refills: 0 | Status: DISCONTINUED | OUTPATIENT
Start: 2018-01-22 | End: 2018-01-22

## 2018-01-22 RX ORDER — OXYCODONE AND ACETAMINOPHEN 5; 325 MG/1; MG/1
1 TABLET ORAL EVERY 6 HOURS
Qty: 0 | Refills: 0 | Status: DISCONTINUED | OUTPATIENT
Start: 2018-01-22 | End: 2018-01-23

## 2018-01-22 RX ORDER — TRAMADOL HYDROCHLORIDE 50 MG/1
1 TABLET ORAL
Qty: 30 | Refills: 0 | OUTPATIENT
Start: 2018-01-22 | End: 2018-01-26

## 2018-01-22 RX ORDER — HYDROMORPHONE HYDROCHLORIDE 2 MG/ML
0.5 INJECTION INTRAMUSCULAR; INTRAVENOUS; SUBCUTANEOUS
Qty: 0 | Refills: 0 | Status: DISCONTINUED | OUTPATIENT
Start: 2018-01-22 | End: 2018-01-22

## 2018-01-22 RX ADMIN — OXYCODONE AND ACETAMINOPHEN 1 TABLET(S): 5; 325 TABLET ORAL at 19:06

## 2018-01-22 RX ADMIN — HYDROMORPHONE HYDROCHLORIDE 0.5 MILLIGRAM(S): 2 INJECTION INTRAMUSCULAR; INTRAVENOUS; SUBCUTANEOUS at 10:15

## 2018-01-22 RX ADMIN — HYDROMORPHONE HYDROCHLORIDE 0.5 MILLIGRAM(S): 2 INJECTION INTRAMUSCULAR; INTRAVENOUS; SUBCUTANEOUS at 10:43

## 2018-01-22 RX ADMIN — OXYCODONE AND ACETAMINOPHEN 1 TABLET(S): 5; 325 TABLET ORAL at 20:00

## 2018-01-22 RX ADMIN — HYDROMORPHONE HYDROCHLORIDE 0.5 MILLIGRAM(S): 2 INJECTION INTRAMUSCULAR; INTRAVENOUS; SUBCUTANEOUS at 11:11

## 2018-01-22 RX ADMIN — SODIUM CHLORIDE 125 MILLILITER(S): 9 INJECTION, SOLUTION INTRAVENOUS at 01:24

## 2018-01-22 RX ADMIN — SODIUM CHLORIDE 125 MILLILITER(S): 9 INJECTION, SOLUTION INTRAVENOUS at 10:38

## 2018-01-22 RX ADMIN — PANTOPRAZOLE SODIUM 40 MILLIGRAM(S): 20 TABLET, DELAYED RELEASE ORAL at 19:07

## 2018-01-22 RX ADMIN — HYDROMORPHONE HYDROCHLORIDE 0.5 MILLIGRAM(S): 2 INJECTION INTRAMUSCULAR; INTRAVENOUS; SUBCUTANEOUS at 10:38

## 2018-01-22 RX ADMIN — SODIUM CHLORIDE 125 MILLILITER(S): 9 INJECTION, SOLUTION INTRAVENOUS at 06:45

## 2018-01-22 NOTE — DISCHARGE NOTE ADULT - PLAN OF CARE
Wound healing Please follow up with Dr. Monge within 1 week   No heavy lifting or straining   Diet as tolerated continue current regimen

## 2018-01-22 NOTE — PROGRESS NOTE ADULT - SUBJECTIVE AND OBJECTIVE BOX
GENERAL SURGERY - PROGRESS NOTE    INTERVAL HPI/OVERNIGHT EVENTS: No events. Pain much improved, now miniimal .Was tolerating clear liquid diet all weekend, now NPO for surgery    ====================================  Vital Signs Last 24 Hrs  T(C): 36.6 (22 Jan 2018 07:18), Max: 37.1 (21 Jan 2018 21:08)  T(F): 97.9 (22 Jan 2018 07:18), Max: 98.8 (21 Jan 2018 21:08)  HR: 55 (22 Jan 2018 07:18) (54 - 85)  BP: 108/59 (22 Jan 2018 07:18) (108/59 - 125/56)  BP(mean): --  RR: 16 (22 Jan 2018 07:18) (16 - 18)  SpO2: 98% (22 Jan 2018 07:18) (98% - 100%)  ====================================  PHYSICAL EXAM:  Gen: alert, comfortable, no distress  HEENT: no scleral icterus  Abd: soft, nondistended - mild tender epigastrium  Ext: no edema noted  Skin: no obvious skin rash noted  ====================================  I&O's Detail    ====================================  LABS:                        13.2   5.9   )-----------( 204      ( 21 Jan 2018 07:08 )             39.4     01-20    140  |  105  |  8   ----------------------------<  90  4.0   |  30  |  0.86    Ca    8.9      20 Jan 2018 08:08  Phos  4.1     01-20  Mg     2.0     01-21    TPro  6.3  /  Alb  3.3<L>  /  TBili  0.5  /  DBili  x   /  AST  27  /  ALT  151<H>  /  AlkPhos  71  01-20    PT/INR - ( 21 Jan 2018 07:08 )   PT: 11.8 sec;   INR: 1.08 ratio         PTT - ( 21 Jan 2018 07:08 )  PTT:31.1 sec    ====================================  RADIOLOGY & ADDITIONAL STUDIES:

## 2018-01-22 NOTE — PROGRESS NOTE ADULT - PROBLEM SELECTOR PLAN 4
stable off medications, needs outpatient follow up.
stable off medications, needs outpatient follow up.
patient is not on any medication, needs outpatient follow up.
stable off medications, needs outpatient follow up.

## 2018-01-22 NOTE — BRIEF OPERATIVE NOTE - OPERATION/FINDINGS
omental adhesions to gallbladder taken down and cystic duct and artery isolated and divided cholangiotomy performed and catheter introduced and cholangiogram performed showing good flow through the common duct into the duodenum. cystic duct had Endoloop placed, good hemostasis achieved and abdomen closed

## 2018-01-22 NOTE — DISCHARGE NOTE ADULT - CARE PLAN
Principal Discharge DX:	Pancreatitis  Goal:	Wound healing  Assessment and plan of treatment:	Please follow up with Dr. Monge within 1 week   No heavy lifting or straining   Diet as tolerated  Secondary Diagnosis:	GERD (gastroesophageal reflux disease)  Goal:	continue current regimen

## 2018-01-22 NOTE — DISCHARGE NOTE ADULT - MEDICATION SUMMARY - MEDICATIONS TO TAKE
I will START or STAY ON the medications listed below when I get home from the hospital:    Ultram 50 mg oral tablet  -- 1 tab(s) by mouth every 4 hours, As Needed -for moderate pain MDD:4 tabs   -- Caution federal law prohibits the transfer of this drug to any person other  than the person for whom it was prescribed.  May cause drowsiness.  Alcohol may intensify this effect.  Use care when operating dangerous machinery.  Obtain medical advice before taking any non-prescription drugs as some may affect the action of this medication.    -- Indication: For Prn pain     raNITIdine 150 mg oral tablet  -- 1 tab(s) by mouth 2 times a day  -- Indication: For GERD (gastroesophageal reflux disease)

## 2018-01-22 NOTE — PROGRESS NOTE ADULT - PROBLEM SELECTOR PLAN 6
improve score of 1  patient is ambulating  protonix for gerd

## 2018-01-22 NOTE — PROGRESS NOTE ADULT - SUBJECTIVE AND OBJECTIVE BOX
Patient is a 22y old  Male who presents with a chief complaint of abdominal pain (18 Jan 2018 01:32)      INTERVAL HPI/OVERNIGHT EVENTS: no overnight events    MEDICATIONS  (STANDING):  lactated ringers. 1000 milliLiter(s) (125 mL/Hr) IV Continuous <Continuous>  lactated ringers. 1000 milliLiter(s) (125 mL/Hr) IV Continuous <Continuous>  pantoprazole  Injectable 40 milliGRAM(s) IV Push daily    MEDICATIONS  (PRN):  HYDROmorphone  Injectable 0.5 milliGRAM(s) IV Push every 10 minutes PRN Severe Pain (7 - 10)  morphine  - Injectable 2 milliGRAM(s) IV Push every 6 hours PRN Severe Pain (7 - 10)  oxyCODONE    5 mG/acetaminophen 325 mG 1 Tablet(s) Oral every 4 hours PRN Moderate Pain      Allergies    No Known Allergies    Intolerances        REVIEW OF SYSTEMS:  admits abdominal pain is improved, denies any fever chills, chest pain, SOB . n/v/d    Vital Signs Last 24 Hrs  T(C): 36.9 (22 Jan 2018 09:56), Max: 37.1 (21 Jan 2018 21:08)  T(F): 98.5 (22 Jan 2018 09:56), Max: 98.8 (21 Jan 2018 21:08)  HR: 65 (22 Jan 2018 10:26) (54 - 85)  BP: 122/66 (22 Jan 2018 10:26) (108/59 - 125/56)  BP(mean): --  RR: 14 (22 Jan 2018 10:26) (14 - 20)  SpO2: 100% (22 Jan 2018 10:26) (97% - 100%)    PHYSICAL EXAM:  GENERAL: NAD,   NECK: Supple,  CHEST/LUNG: Clear to percussion bilaterally; No rales, rhonchi, wheezing, or rubs  HEART: Regular rate and rhythm; No murmurs, rubs, or gallops  ABDOMEN: Soft, tenderness on deep palpation of epigastric region, Nondistended; Bowel sounds present  NERVOUS SYSTEM:  Alert & Oriented X3,non focal      LABS:                        13.2   5.9   )-----------( 204      ( 21 Jan 2018 07:08 )             39.4     01-22    140  |  106  |  5<L>  ----------------------------<  88  3.7   |  28  |  0.89    Ca    9.0      22 Jan 2018 08:03  Phos  4.7     01-22  Mg     2.1     01-22    TPro  6.7  /  Alb  3.6  /  TBili  0.7  /  DBili  x   /  AST  27  /  ALT  113<H>  /  AlkPhos  69  01-22    PT/INR - ( 22 Jan 2018 08:03 )   PT: 11.9 sec;   INR: 1.09 ratio         PTT - ( 22 Jan 2018 08:03 )  PTT:30.1 sec    CAPILLARY BLOOD GLUCOSE          RADIOLOGY & ADDITIONAL TESTS:    Imaging Personally Reviewed:  [ ] YES  [ ] NO    Consultant(s) Notes Reviewed:  [ ] YES  [ ] NO

## 2018-01-22 NOTE — PROGRESS NOTE ADULT - SUBJECTIVE AND OBJECTIVE BOX
22 M POD 0 from laparoscopic cholecystectomy for gallstone pancreatitis. Patient tolerated procedure well without complication. Patient is okay to be discharged home if he is able to ambulate, void, and pain is controlled on oral pain medication. Patient will follow up with Dr. Monge in 1-2 weeks after discharge.      22 M POD 0 from lap cholecystectomy doing well  - regular diet  - Pain control   - Ambulate as tolerated  - Okay to be discharged per surgery standpoint with follow up pending ability to ambulate, tolerate diet, void and pain is controlled

## 2018-01-22 NOTE — DISCHARGE NOTE ADULT - PATIENT PORTAL LINK FT
“You can access the FollowHealth Patient Portal, offered by [facility], by registering with the following website: [email or web address]”

## 2018-01-22 NOTE — PROGRESS NOTE ADULT - PROBLEM SELECTOR PLAN 5
patient is not on any medication, needs outpatient follow up.
patient is not on any medication, needs outpatient follow up.
improve score of 1  patient is ambulating  protonix for gerd
patient is not on any medication, needs outpatient follow up.

## 2018-01-22 NOTE — BRIEF OPERATIVE NOTE - PROCEDURE
<<-----Click on this checkbox to enter Procedure Laparoscopic cholecystectomy w cholangiography  01/22/2018    Active  FADY

## 2018-01-22 NOTE — PROGRESS NOTE ADULT - ATTENDING COMMENTS
Patient seen and examined earlier this afternoon post lap. kourtney; Agree with PGY 3 A/P above with editing as needed.    Patient is doing well. No new complaints. Mother was at bedside; Discussed with Dr. Monge earlier this morning, Surgery went well; was advised to advance diet and discharge plan if tolerated    Vital Signs Last 24 Hrs  T(C): 36.3 (22 Jan 2018 16:11), Max: 37.1 (21 Jan 2018 21:08)  T(F): 97.4 (22 Jan 2018 16:11), Max: 98.8 (21 Jan 2018 21:08)  HR: 87 (22 Jan 2018 16:11) (55 - 106)  BP: 116/52 (22 Jan 2018 16:11) (96/36 - 128/68)  RR: 16 (22 Jan 2018 16:11) (10 - 20)  SpO2: 100% (22 Jan 2018 16:11) (96% - 100%)    P/E: As above  GI: Soft, BS++, Mildly tender    Labs: Stable    D/D:  Gallstone Pancreatitis s/p laparoscopic cholecystectomy today  Hx GERD    Plan:  Advance diet to Clear to soft  If tolerated and patient feels comfortable D/C Home tonight, If not D/C Home tomorrow.   Discussed with Patient and Mother at bedside earlier today  Discussed with YAMILA Burnett  Discussed with PGY 3 Dr. Garcia

## 2018-01-23 VITALS
TEMPERATURE: 98 F | HEART RATE: 62 BPM | RESPIRATION RATE: 17 BRPM | OXYGEN SATURATION: 97 % | DIASTOLIC BLOOD PRESSURE: 64 MMHG | SYSTOLIC BLOOD PRESSURE: 133 MMHG

## 2018-01-23 PROCEDURE — 80048 BASIC METABOLIC PNL TOTAL CA: CPT

## 2018-01-23 PROCEDURE — 82150 ASSAY OF AMYLASE: CPT

## 2018-01-23 PROCEDURE — 86790 VIRUS ANTIBODY NOS: CPT

## 2018-01-23 PROCEDURE — 87389 HIV-1 AG W/HIV-1&-2 AB AG IA: CPT

## 2018-01-23 PROCEDURE — 85610 PROTHROMBIN TIME: CPT

## 2018-01-23 PROCEDURE — 71045 X-RAY EXAM CHEST 1 VIEW: CPT

## 2018-01-23 PROCEDURE — 74177 CT ABD & PELVIS W/CONTRAST: CPT

## 2018-01-23 PROCEDURE — 88304 TISSUE EXAM BY PATHOLOGIST: CPT

## 2018-01-23 PROCEDURE — 82787 IGG 1 2 3 OR 4 EACH: CPT

## 2018-01-23 PROCEDURE — 84443 ASSAY THYROID STIM HORMONE: CPT

## 2018-01-23 PROCEDURE — 80061 LIPID PANEL: CPT

## 2018-01-23 PROCEDURE — C1889: CPT

## 2018-01-23 PROCEDURE — 86850 RBC ANTIBODY SCREEN: CPT

## 2018-01-23 PROCEDURE — 99239 HOSP IP/OBS DSCHRG MGMT >30: CPT

## 2018-01-23 PROCEDURE — 86900 BLOOD TYPING SEROLOGIC ABO: CPT

## 2018-01-23 PROCEDURE — 83690 ASSAY OF LIPASE: CPT

## 2018-01-23 PROCEDURE — 82306 VITAMIN D 25 HYDROXY: CPT

## 2018-01-23 PROCEDURE — 76000 FLUOROSCOPY <1 HR PHYS/QHP: CPT

## 2018-01-23 PROCEDURE — 76705 ECHO EXAM OF ABDOMEN: CPT

## 2018-01-23 PROCEDURE — 99285 EMERGENCY DEPT VISIT HI MDM: CPT | Mod: 25

## 2018-01-23 PROCEDURE — 83735 ASSAY OF MAGNESIUM: CPT

## 2018-01-23 PROCEDURE — 80307 DRUG TEST PRSMV CHEM ANLYZR: CPT

## 2018-01-23 PROCEDURE — 85027 COMPLETE CBC AUTOMATED: CPT

## 2018-01-23 PROCEDURE — 84100 ASSAY OF PHOSPHORUS: CPT

## 2018-01-23 PROCEDURE — 80074 ACUTE HEPATITIS PANEL: CPT

## 2018-01-23 PROCEDURE — 96374 THER/PROPH/DIAG INJ IV PUSH: CPT | Mod: XU

## 2018-01-23 PROCEDURE — 86901 BLOOD TYPING SEROLOGIC RH(D): CPT

## 2018-01-23 PROCEDURE — 80076 HEPATIC FUNCTION PANEL: CPT

## 2018-01-23 PROCEDURE — 81003 URINALYSIS AUTO W/O SCOPE: CPT

## 2018-01-23 PROCEDURE — 80053 COMPREHEN METABOLIC PANEL: CPT

## 2018-01-23 PROCEDURE — 85730 THROMBOPLASTIN TIME PARTIAL: CPT

## 2018-01-23 RX ADMIN — OXYCODONE AND ACETAMINOPHEN 1 TABLET(S): 5; 325 TABLET ORAL at 01:41

## 2018-01-23 RX ADMIN — OXYCODONE AND ACETAMINOPHEN 1 TABLET(S): 5; 325 TABLET ORAL at 02:40

## 2018-01-23 NOTE — PROGRESS NOTE ADULT - PROBLEM SELECTOR PLAN 1
Gallstone pancreatitis  sonogram showed evidence of multiple gall stones without any CBD dilatation  plan for laparoscopic cholecystectomy on Monday  c/w iv fluids; reduced to 150cxc /hr earlier today  iv pain medication as needed
Gallstone pancreatitis- s/p laparoscopic cholecystectomy POD#1  Pain controlled with current regimen  Feels well  Tolerating diet  stable for discharge home today and to follow up with surgeon within 1-2 weeks of discharge.
Gallstone pancreatitis  sonogram showed evidence of multiple gall stones without any CBD dilatation  plan for laparoscopic cholecystectomy on Monday  c/w iv fluids;  pain medication as needed  plan for lap kourtney today
improving  lipase trending down  likely secondary to gall stone  sonogram showed evidence of multiple gall stones without any CBD dilatation  plan for laparoscopic cholecystectomy on Monday  c/w iv fluids  iv pain medication
Gallstone pancreatitis  sonogram showed evidence of multiple gall stones without any CBD dilatation  plan for laparoscopic cholecystectomy on Monday  c/w iv fluids; reduced to 150cxc /hr earlier today  pain medication as needed

## 2018-01-23 NOTE — PROGRESS NOTE ADULT - SUBJECTIVE AND OBJECTIVE BOX
22 M POD 1 from laparoscopic cholecystectomy for gallstone pancreatitis. Pt seen and examined at bedside appears in no acute distress resting comfortably. Patient reports no overnight events although he still reports abdominal pain worst in the RUQ. Patient denies fever, chills, nausea, vomiting. Tolerating diet, ambulating and voiding.    Afebrile and hemodynamically stable overnight, abdominal wound dressings mild saturation, dry and intact, flat, soft, mild tenderness to palpation of RUQ and RLQ appropriate for POD 1.    22 M POD 1 from lap cholecystectomy for gallstone pancreatitis doing well clinically   - Discharge home with follow up in a week   - Pain medication Rx sent to pharmacy   - All questions answered

## 2018-01-23 NOTE — PROGRESS NOTE ADULT - SUBJECTIVE AND OBJECTIVE BOX
MEDICAL ATTENDING NOTE- Late entry- patient was seen earlier today at about 12:15pm    Patient is a 22y old  Male who presents with a chief complaint of abdominal pain (22 Jan 2018 15:29)      INTERVAL HPI/ OVERNIGHT EVENTS: no new complaints; feels well.    MEDICATIONS  (STANDING):  lactated ringers. 1000 milliliters (125 mL/Hr) IV Continuous <Continuous>  pantoprazole  Injectable 40 milliGRAM(s) IV Push daily    MEDICATIONS  (PRN):  morphine  - Injectable 2 milliGRAM(s) IV Push every 6 hours PRN Severe Pain (7 - 10)  oxyCODONE    5 mG/ acetaminophen 325 mG 1 Tablet(s) Oral every 6 hours PRN Moderate Pain (4 - 6)      __________________________________________________  ----------------------------------------------------------------------------------  REVIEW OF SYSTEMS: no fever, no SOB, No Chest pain; feels well      Vital Signs Last 24 Hrs  T(C): 36.7 (23 Jan 2018 12:47), Max: 37.2 (22 Jan 2018 22:21)  T(F): 98 (23 Jan 2018 12:47), Max: 99 (22 Jan 2018 22:21)  HR: 62 (23 Jan 2018 12:47) (51 - 73)  BP: 133/64 (23 Jan 2018 12:47) (110/60 - 133/64)  BP(mean): --  RR: 17 (23 Jan 2018 12:47) (16 - 17)  SpO2: 97% (23 Jan 2018 12:47) (97% - 100%)    _________________  PHYSICAL EXAM:  ---------------------------   NAD; Normocephalic;   LUNGS - no wheezing  HEART: S1 S2+   ABDOMEN: Soft, minimal tenderness at surgical site, non distended; BS+  EXTREMITIES: no cyanosis; no edema  NERVOUS SYSTEM:  Awake and alert; no new deficits    _________________________________________________  LABS:    01-22    140  |  106  |  5<L>  ----------------------------<  88  3.7   |  28  |  0.89    Ca    9.0      22 Jan 2018 08:03  Phos  4.7     01-22  Mg     2.1     01-22    TPro  6.7  /  Alb  3.6  /  TBili  0.7  /  DBili  x   /  AST  27  /  ALT  113<H>  /  AlkPhos  69  01-22    PT/INR - ( 22 Jan 2018 08:03 )   PT: 11.9 sec;   INR: 1.09 ratio         PTT - ( 22 Jan 2018 08:03 )  PTT:30.1 sec    CAPILLARY BLOOD GLUCOSE                Care Discussed with Consultants :     Plan of care was discussed with patient ; all questions and concerns were addressed and care was aligned with patient's wishes.  Patient has been advised to follow up with PMD and surgeon upon discharge from the hospital.  Discharge plans discussed with nursing staff.

## 2018-01-23 NOTE — PROGRESS NOTE ADULT - ASSESSMENT
23 y/o M PMHx of Anxiety and Depression ( was on Lexapro and alprazolam Hx of suicide attempts X2), not on  any medications for a year, GERD presented to ED with c/o abdominal pain since 3 days admitted for gallstone pancreatitis.
21 y/o M PMHx of Anxiety and Depression ( was on Lexapro and alprazolam Hx of suicide attempts X2, GERD presented to ED with c/o abdominal pain since 3 days admitted for gallstone pancreatitis.
23 y/o M PMHx of Anxiety and Depression ( was on Lexapro and alprazolam Hx of suicide attempts X2), not on  any medications for a year, GERD presented to ED with c/o abdominal pain since 3 days admitted for gallstone pancreatitis.
23 y/o male with resolved gallstone pancreatitis      1. Plan for OR tomorrow for Lap Kira  2. NPO after midnight  3. IVF while NPO  4. Dr. Monge to consent patient in AM
gallstone pancreatitis, improving clinically  Plan for OR today for lap kourtney with cholangiogram
23 y/o M PMHx of Anxiety and Depression ( was on Lexapro and alprazolam Hx of suicide attempts X2), not on  any medications for a year, GERD presented to ED with c/o abdominal pain since 3 days admitted for gallstone pancreatitis.
21 y/o M PMHx of Anxiety and Depression ( was on Lexapro and alprazolam Hx of suicide attempts X2), not on  any medications for a year, GERD presented to ED with c/o abdominal pain since 3 days admitted for gallstone pancreatitis.

## 2018-01-23 NOTE — PROGRESS NOTE ADULT - PROBLEM SELECTOR PROBLEM 3
GERD (gastroesophageal reflux disease)
Depression
Depression
GERD (gastroesophageal reflux disease)
GERD (gastroesophageal reflux disease)

## 2018-01-23 NOTE — PROGRESS NOTE ADULT - ATTENDING COMMENTS
Agree with above  Patient kept in hospital yesterday due to postoperative pain, now improving  Ambulating, voiding, tolerating PO intake  Abd exam benign  Stable for dc home today - fu with me one week

## 2018-01-23 NOTE — PROGRESS NOTE ADULT - PROVIDER SPECIALTY LIST ADULT
Internal Medicine
Internal Medicine
Surgery
Hospitalist
Internal Medicine
Hospitalist

## 2018-01-24 LAB — HEV IGM SER QL: SIGNIFICANT CHANGE UP

## 2018-01-25 PROBLEM — Z00.00 ENCOUNTER FOR PREVENTIVE HEALTH EXAMINATION: Status: ACTIVE | Noted: 2018-01-25

## 2018-02-09 ENCOUNTER — APPOINTMENT (OUTPATIENT)
Dept: BARIATRICS | Facility: CLINIC | Age: 23
End: 2018-02-09
Payer: MEDICAID

## 2018-02-09 VITALS
HEIGHT: 73 IN | BODY MASS INDEX: 21.6 KG/M2 | DIASTOLIC BLOOD PRESSURE: 77 MMHG | OXYGEN SATURATION: 95 % | HEART RATE: 79 BPM | SYSTOLIC BLOOD PRESSURE: 120 MMHG | TEMPERATURE: 98.4 F | WEIGHT: 163 LBS

## 2018-02-09 DIAGNOSIS — Z83.49 FAMILY HISTORY OF OTHER ENDOCRINE, NUTRITIONAL AND METABOLIC DISEASES: ICD-10-CM

## 2018-02-09 DIAGNOSIS — Z48.89 ENCOUNTER FOR OTHER SPECIFIED SURGICAL AFTERCARE: ICD-10-CM

## 2018-02-09 DIAGNOSIS — F41.9 ANXIETY DISORDER, UNSPECIFIED: ICD-10-CM

## 2018-02-09 DIAGNOSIS — Z87.19 PERSONAL HISTORY OF OTHER DISEASES OF THE DIGESTIVE SYSTEM: ICD-10-CM

## 2018-02-09 DIAGNOSIS — Z86.59 PERSONAL HISTORY OF OTHER MENTAL AND BEHAVIORAL DISORDERS: ICD-10-CM

## 2018-02-09 PROCEDURE — 99024 POSTOP FOLLOW-UP VISIT: CPT

## 2018-02-09 RX ORDER — RANITIDINE HYDROCHLORIDE 150 MG/1
150 CAPSULE ORAL
Qty: 60 | Refills: 0 | Status: ACTIVE | COMMUNITY
Start: 2017-12-19

## 2018-11-20 NOTE — ED ADULT NURSE NOTE - CAS EDN DISCHARGE INTERVENTIONS
IV discontinued, cath removed intact
airway patent/breath sounds equal/good air movement/clear to auscultation bilaterally/respirations non-labored/no chest wall tenderness

## 2019-04-30 NOTE — ED ADULT NURSE NOTE - ED STAT RN HAND OFF
IV drug use-heroin, uses medical marijuana to help control drug use  Case management consult for potential rehab referral Handoff

## 2020-06-18 ENCOUNTER — EMERGENCY (EMERGENCY)
Facility: HOSPITAL | Age: 25
LOS: 1 days | Discharge: HOME | End: 2020-06-18
Attending: EMERGENCY MEDICINE
Payer: COMMERCIAL

## 2020-06-18 VITALS
HEART RATE: 63 BPM | TEMPERATURE: 98 F | SYSTOLIC BLOOD PRESSURE: 133 MMHG | DIASTOLIC BLOOD PRESSURE: 78 MMHG | RESPIRATION RATE: 16 BRPM | OXYGEN SATURATION: 100 %

## 2020-06-18 VITALS — HEIGHT: 73 IN | WEIGHT: 160.06 LBS

## 2020-06-18 PROBLEM — K21.9 GASTRO-ESOPHAGEAL REFLUX DISEASE WITHOUT ESOPHAGITIS: Chronic | Status: ACTIVE | Noted: 2018-01-18

## 2020-06-18 PROBLEM — F32.9 MAJOR DEPRESSIVE DISORDER, SINGLE EPISODE, UNSPECIFIED: Chronic | Status: ACTIVE | Noted: 2018-01-18

## 2020-06-18 PROBLEM — F41.9 ANXIETY DISORDER, UNSPECIFIED: Chronic | Status: ACTIVE | Noted: 2018-01-18

## 2020-06-18 LAB
CK SERPL-CCNC: 176 U/L — SIGNIFICANT CHANGE UP (ref 35–232)
TROPONIN I SERPL-MCNC: <0.015 NG/ML — SIGNIFICANT CHANGE UP (ref 0–0.04)

## 2020-06-18 PROCEDURE — 82550 ASSAY OF CK (CPK): CPT

## 2020-06-18 PROCEDURE — 99284 EMERGENCY DEPT VISIT MOD MDM: CPT | Mod: 25

## 2020-06-18 PROCEDURE — 99283 EMERGENCY DEPT VISIT LOW MDM: CPT

## 2020-06-18 PROCEDURE — 71046 X-RAY EXAM CHEST 2 VIEWS: CPT

## 2020-06-18 PROCEDURE — 36415 COLL VENOUS BLD VENIPUNCTURE: CPT

## 2020-06-18 PROCEDURE — 71046 X-RAY EXAM CHEST 2 VIEWS: CPT | Mod: 26

## 2020-06-18 PROCEDURE — 84484 ASSAY OF TROPONIN QUANT: CPT

## 2020-06-18 PROCEDURE — 93005 ELECTROCARDIOGRAM TRACING: CPT

## 2020-06-18 RX ORDER — IBUPROFEN 200 MG
600 TABLET ORAL ONCE
Refills: 0 | Status: COMPLETED | OUTPATIENT
Start: 2020-06-18 | End: 2020-06-18

## 2020-06-18 RX ORDER — IBUPROFEN 200 MG
1 TABLET ORAL
Qty: 28 | Refills: 0
Start: 2020-06-18 | End: 2020-06-24

## 2020-06-18 RX ADMIN — Medication 600 MILLIGRAM(S): at 17:12

## 2020-06-18 NOTE — ED PROVIDER NOTE - PATIENT PORTAL LINK FT
You can access the FollowMyHealth Patient Portal offered by [facility] by registering at the following website: [email or web address]. By joining [facility]’s FollowMyHealth portal, you will also be able to view your health information using other applications (apps) compatible with our system.

## 2020-06-18 NOTE — ED PROVIDER NOTE - CHPI ED SYMPTOMS NEG
no dizziness/no chills/no diaphoresis/no shortness of breath/no fever/no syncope/no nausea/no back pain/no vomiting/no cough
